# Patient Record
Sex: FEMALE | Race: WHITE | Employment: FULL TIME | URBAN - METROPOLITAN AREA
[De-identification: names, ages, dates, MRNs, and addresses within clinical notes are randomized per-mention and may not be internally consistent; named-entity substitution may affect disease eponyms.]

---

## 2019-04-01 ENCOUNTER — HOSPITAL ENCOUNTER (OUTPATIENT)
Dept: NUCLEAR MEDICINE | Age: 29
Discharge: HOME | End: 2019-04-01
Attending: INTERNAL MEDICINE
Payer: COMMERCIAL

## 2019-04-01 DIAGNOSIS — R11.10 VOMITING: ICD-10-CM

## 2019-04-01 DIAGNOSIS — R19.7 DIARRHEA: ICD-10-CM

## 2019-04-01 DIAGNOSIS — K31.84 GASTROPARESIS: ICD-10-CM

## 2019-04-01 PROCEDURE — 78264 GASTRIC EMPTYING IMG STUDY: CPT

## 2019-04-04 ENCOUNTER — OFFICE VISIT (OUTPATIENT)
Dept: URGENT CARE | Age: 29
End: 2019-04-04

## 2019-04-04 VITALS
HEART RATE: 85 BPM | DIASTOLIC BLOOD PRESSURE: 83 MMHG | SYSTOLIC BLOOD PRESSURE: 128 MMHG | WEIGHT: 158 LBS | BODY MASS INDEX: 24.8 KG/M2 | OXYGEN SATURATION: 99 % | HEIGHT: 67 IN | TEMPERATURE: 98.1 F | RESPIRATION RATE: 16 BRPM

## 2019-04-04 DIAGNOSIS — L08.9 LOCALIZED BACTERIAL INFECTION OF SKIN: Primary | ICD-10-CM

## 2019-04-04 DIAGNOSIS — B96.89 LOCALIZED BACTERIAL INFECTION OF SKIN: Primary | ICD-10-CM

## 2019-04-04 RX ORDER — MINERAL OIL
180 ENEMA (ML) RECTAL
COMMUNITY

## 2019-04-04 RX ORDER — CEPHALEXIN 500 MG/1
500 CAPSULE ORAL 2 TIMES DAILY
Qty: 20 CAP | Refills: 0 | Status: SHIPPED | OUTPATIENT
Start: 2019-04-04 | End: 2019-04-14

## 2019-04-04 RX ORDER — SERTRALINE HYDROCHLORIDE 100 MG/1
100 TABLET, FILM COATED ORAL DAILY
COMMUNITY

## 2019-04-04 NOTE — PATIENT INSTRUCTIONS
Should see your primary care doctor:  Given rosebush injury to consider sporotrichosis (fungus), they would need to check liver enzymes prior to initiating treatment. Follow up within next 2-4 days  New, worsening or changes, follow up immediatly       Scrapes (Abrasions): Care Instructions  Your Care Instructions  Scrapes (abrasions) are wounds where your skin has been rubbed or torn off. Most scrapes do not go deep into the skin, but some may remove several layers of skin. Scrapes usually don't bleed much, but they may ooze pinkish fluid. Scrapes on the head or face may appear worse than they are. They may bleed a lot because of the good blood supply to this area. Most scrapes heal well and may not need a bandage. They usually heal within 3 to 7 days. A large, deep scrape may take 1 to 2 weeks or longer to heal. A scab may form on some scrapes. Follow-up care is a key part of your treatment and safety. Be sure to make and go to all appointments, and call your doctor if you are having problems. It's also a good idea to know your test results and keep a list of the medicines you take. How can you care for yourself at home? · If your doctor told you how to care for your wound, follow your doctor's instructions. If you did not get instructions, follow this general advice:  ? Wash the scrape with clean water 2 times a day. Don't use hydrogen peroxide or alcohol, which can slow healing. ? You may cover the scrape with a thin layer of petroleum jelly, such as Vaseline, and a nonstick bandage. ? Apply more petroleum jelly and replace the bandage as needed. · Prop up the injured area on a pillow anytime you sit or lie down during the next 3 days. Try to keep it above the level of your heart. This will help reduce swelling. · Be safe with medicines. Take pain medicines exactly as directed. ? If the doctor gave you a prescription medicine for pain, take it as prescribed.   ? If you are not taking a prescription pain medicine, ask your doctor if you can take an over-the-counter medicine. When should you call for help? Call your doctor now or seek immediate medical care if:    · You have signs of infection, such as:  ? Increased pain, swelling, warmth, or redness around the scrape. ? Red streaks leading from the scrape. ? Pus draining from the scrape. ? A fever.     · The scrape starts to bleed, and blood soaks through the bandage. Oozing small amounts of blood is normal.    Watch closely for changes in your health, and be sure to contact your doctor if the scrape is not getting better each day. Where can you learn more? Go to http://bryson-dara.info/. Enter A374 in the search box to learn more about \"Scrapes (Abrasions): Care Instructions. \"  Current as of: September 23, 2018  Content Version: 11.9  © 2732-5476 [employer]. Care instructions adapted under license by [employer] (which disclaims liability or warranty for this information). If you have questions about a medical condition or this instruction, always ask your healthcare professional. [place] [address] any warranty or liability for your use of this information.

## 2019-04-04 NOTE — PROGRESS NOTES
Here for left index finger redness quarter sized on back of hand near knuckle  Onset 1 day after scrape by ajit  Denies fever, chills trouble moving, numbness or tingling  Up to date on tetanus  Has not tried meds  Denies diabetes or immune system compromise             History reviewed. No pertinent past medical history. History reviewed. No pertinent surgical history. History reviewed. No pertinent family history. Social History     Socioeconomic History    Marital status: UNKNOWN     Spouse name: Not on file    Number of children: Not on file    Years of education: Not on file    Highest education level: Not on file   Occupational History    Not on file   Social Needs    Financial resource strain: Not on file    Food insecurity:     Worry: Not on file     Inability: Not on file    Transportation needs:     Medical: Not on file     Non-medical: Not on file   Tobacco Use    Smoking status: Never Smoker    Smokeless tobacco: Never Used   Substance and Sexual Activity    Alcohol use: Not on file    Drug use: Not on file    Sexual activity: Not on file   Lifestyle    Physical activity:     Days per week: Not on file     Minutes per session: Not on file    Stress: Not on file   Relationships    Social connections:     Talks on phone: Not on file     Gets together: Not on file     Attends Tenriism service: Not on file     Active member of club or organization: Not on file     Attends meetings of clubs or organizations: Not on file     Relationship status: Not on file    Intimate partner violence:     Fear of current or ex partner: Not on file     Emotionally abused: Not on file     Physically abused: Not on file     Forced sexual activity: Not on file   Other Topics Concern    Not on file   Social History Narrative    Not on file                ALLERGIES: Patient has no known allergies. Review of Systems   Constitutional: Negative for chills and fever.    Neurological: Negative for dizziness. All other systems reviewed and are negative. Vitals:    04/04/19 0932   BP: 128/83   Pulse: 85   Resp: 16   Temp: 98.1 °F (36.7 °C)   SpO2: 99%   Weight: 158 lb (71.7 kg)   Height: 5' 7\" (1.702 m)       Physical Exam   Constitutional: She is oriented to person, place, and time. No distress. Well appearing    HENT:   Mouth/Throat: Oropharynx is clear and moist.   Eyes: Pupils are equal, round, and reactive to light. EOM are normal.   Neck: Neck supple. Cardiovascular: Normal rate, regular rhythm and normal heart sounds. Pulmonary/Chest: Effort normal and breath sounds normal.   Lymphadenopathy:     She has no cervical adenopathy. Neurological: She is alert and oriented to person, place, and time. Skin: She is not diaphoretic. Quarter sized area of erythema skin over left knuckle MCP . No joinline TTP. Full AROM without pain. 5/5 flexion and extension strength. Good cap refill. No streaking. No finger edema or wound edema. No foreign body identified. Psychiatric: She has a normal mood and affect. Her behavior is normal. Thought content normal.       MDM     Differential Diagnosis; Clinical Impression; Plan:       CLINICAL IMPRESSION:  (L08.9,  B96.89) Localized bacterial infection of skin  (primary encounter diagnosis)    Orders Placed This Encounter      cephALEXin (KEFLEX) 500 mg capsule          Sig: Take 1 Cap by mouth two (2) times a day for 10 days. Dispense:  20 Cap          Refill:  0      Plan:  DDX consider sporotrichosis given rosebush injury  Advised PCP eval in 2-4 days; should have liver enzymes checked by PCP prior to initiating treatment if not improving on cephalexin  Warm/hot compresses  Start cephalexin for possible bacterial etiology  Up to date on tetanus (patient promotes < 5 years ago)    We have reviewed concerning signs/symptoms, normal vs abnormal progression of medical condition and when to seek immediate medical attention.   Schedule with PCP or Urgent Care immediately for worsening or new symptoms. You should see your PCP for updates on your routine health maintenance.              Procedures

## 2020-09-10 ENCOUNTER — DOCUMENTATION ONLY (OUTPATIENT)
Dept: CARDIOLOGY CLINIC | Age: 30
End: 2020-09-10

## 2020-10-15 ENCOUNTER — CLINICAL SUPPORT (OUTPATIENT)
Dept: CARDIOLOGY CLINIC | Age: 30
End: 2020-10-15

## 2020-10-15 ENCOUNTER — OFFICE VISIT (OUTPATIENT)
Dept: CARDIOLOGY CLINIC | Age: 30
End: 2020-10-15
Payer: COMMERCIAL

## 2020-10-15 VITALS
SYSTOLIC BLOOD PRESSURE: 134 MMHG | HEIGHT: 67 IN | BODY MASS INDEX: 27.37 KG/M2 | DIASTOLIC BLOOD PRESSURE: 84 MMHG | RESPIRATION RATE: 18 BRPM | OXYGEN SATURATION: 98 % | HEART RATE: 84 BPM | WEIGHT: 174.4 LBS

## 2020-10-15 DIAGNOSIS — R00.2 PALPITATION: ICD-10-CM

## 2020-10-15 DIAGNOSIS — R07.9 CHEST PAIN, UNSPECIFIED TYPE: ICD-10-CM

## 2020-10-15 DIAGNOSIS — R00.2 PALPITATIONS: Primary | ICD-10-CM

## 2020-10-15 DIAGNOSIS — R07.9 CHEST PAIN, UNSPECIFIED TYPE: Primary | ICD-10-CM

## 2020-10-15 PROCEDURE — 99204 OFFICE O/P NEW MOD 45 MIN: CPT | Performed by: INTERNAL MEDICINE

## 2020-10-15 PROCEDURE — 93000 ELECTROCARDIOGRAM COMPLETE: CPT | Performed by: INTERNAL MEDICINE

## 2020-10-15 RX ORDER — GLUCOSAMINE SULFATE 1500 MG
POWDER IN PACKET (EA) ORAL DAILY
COMMUNITY

## 2020-10-15 RX ORDER — SUMATRIPTAN 50 MG/1
50 TABLET, FILM COATED ORAL
COMMUNITY

## 2020-10-15 NOTE — PROGRESS NOTES
1. Have you been to the ER, urgent care clinic since your last visit? Hospitalized since your last visit? No    2. Have you seen or consulted any other health care providers outside of the [address] [place] [address] since your last visit? Include any pap smears or colon screening. No    Chief Complaint   Patient presents with    Chest Pain     NP, ref by Mary Gee NP.  C/O palps, CP lying down.

## 2020-10-15 NOTE — PROGRESS NOTES
Patient received a 2 week event monitor. Instructions given verbally as well as an instruction sheet. Pt verbalized understanding.     [facility] Event Monitoring

## 2020-10-15 NOTE — PROGRESS NOTES
[address]  [phone]     Subjective:      Lena Burrows is a 26 y.o. female with pmhx IBS and migraineis here to establish care. Referred by PCP for chest pressure and palpitation x 2-3 mos. If she sleeps on her side, the cp is relieved. Family hx early CAD (grandfather MI age 36)    She usually jogs 2-3 times a week, walks on opposite days. No exertional symptoms. Denies caffeine / stimulant intake    The patient denies shortness of breath, orthopnea, PND, LE edema, syncope, or presyncope. There are no active problems to display for this patient. Cortney Bonilla NP  History reviewed. No pertinent past medical history. History reviewed. No pertinent surgical history.   No Known Allergies   Family History   Problem Relation Age of Onset    Coronary Artery Disease Maternal Grandfather       Social History     Socioeconomic History    Marital status: UNKNOWN     Spouse name: Not on file    Number of children: Not on file    Years of education: Not on file    Highest education level: Not on file   Occupational History    Not on file   Social Needs    Financial resource strain: Not on file    Food insecurity     Worry: Not on file     Inability: Not on file    Transportation needs     Medical: Not on file     Non-medical: Not on file   Tobacco Use    Smoking status: Never Smoker    Smokeless tobacco: Never Used   Substance and Sexual Activity    Alcohol use: Yes     Frequency: Monthly or less     Drinks per session: 1 or 2     Binge frequency: Never     Comment: rare    Drug use: Never    Sexual activity: Yes     Partners: Male     Birth control/protection: Pill   Lifestyle    Physical activity     Days per week: Not on file     Minutes per session: Not on file    Stress: Not on file   Relationships    Social connections     Talks on phone: Not on file     Gets together: Not on file     Attends Oriental orthodox service: Not on file     Active member of club or organization: Not on file     Attends meetings of clubs or organizations: Not on file     Relationship status: Not on file    Intimate partner violence     Fear of current or ex partner: Not on file     Emotionally abused: Not on file     Physically abused: Not on file     Forced sexual activity: Not on file   Other Topics Concern    Not on file   Social History Narrative    Not on file      Current Outpatient Medications   Medication Sig    SUMAtriptan (IMITREX) 50 mg tablet Take 50 mg by mouth once as needed for Migraine.  cholecalciferol (Vitamin D3) 25 mcg (1,000 unit) cap Take  by mouth daily.  fexofenadine (ALLEGRA) 180 mg tablet Take 180 mg by mouth daily as needed.  sertraline (ZOLOFT) 100 mg tablet Take 100 mg by mouth daily.  L-norgest/e.estradiol-e.estrad (SEASONIQUE PO) Take  by mouth. No current facility-administered medications for this visit. Review of Symptoms:  11 systems reviewed, negative other than as stated in the HPI    Physical ExamPhysical Exam:    Vitals:    10/15/20 1435 10/15/20 1449 10/15/20 1504   BP: (!) 154/94 (!) 148/92 134/84   Pulse: 84     Resp: 18     SpO2: 98%     Weight: 174 lb 6.4 oz (79.1 kg)     Height: 5' 7\" (1.702 m)       Body mass index is 27.31 kg/m². General PE  Gen:  NAD  Mental Status - Alert. General Appearance - Not in acute distress. HEENT:  PERRL, no carotid bruits or JVD  Chest and Lung Exam   Inspection: Accessory muscles - No use of accessory muscles in breathing. Auscultation:   Breath sounds: - Normal.   Cardiovascular   Inspection: Jugular vein - Bilateral - Inspection Normal.   Palpation/Percussion:   Apical Impulse: - Normal.   Auscultation: Rhythm - Regular. Heart Sounds - S1 WNL and S2 WNL. No S3 or S4. Murmurs & Other Heart Sounds: Auscultation of the heart reveals - No Murmurs.    Peripheral Vascular   Upper Extremity: Inspection - Bilateral - No Cyanotic nailbeds or Digital clubbing. Lower Extremity:   Palpation: Edema - Bilateral - No edema. Abdomen:   Soft, non-tender, bowel sounds are active. Neuro: A&O times 3, CN and motor grossly WNL    Labs:   No results found for: CHOL, CHOLX, CHLST, CHOLV, 187729, HDL, HDLP, LDL, LDLC, DLDLP, TGLX, TRIGL, TRIGP, CHHD, CHHDX  No results found for: CPK, CPKX, CPX  No results found for: NA, K, CL, CO2, AGAP, GLU, BUN, CREA, BUCR, GFRAA, GFRNA, CA, TBIL, TBILI, AP, TP, ALB, GLOB, AGRAT, ALT    EKG:  SR     Assessment:     Assessment:      1. Chest pain, unspecified type    2. Palpitation        Orders Placed This Encounter    LOOP MONITOR, Clinic Performed     Standing Status:   Future     Standing Expiration Date:   4/15/2021     Order Specific Question:   Reason for Exam:     Answer:   intermittent palpitation    AMB POC EKG ROUTINE W/ 12 LEADS, INTER & REP     Order Specific Question:   Reason for Exam:     Answer:   routine    SUMAtriptan (IMITREX) 50 mg tablet     Sig: Take 50 mg by mouth once as needed for Migraine.  cholecalciferol (Vitamin D3) 25 mcg (1,000 unit) cap     Sig: Take  by mouth daily. Plan:     Atypical cp  Obtain echo and stress ekg    Palpitation, intermittent  TSH/Hgb/Lytes stable 9/2020  Obtain 2 week event    9/2020     Continue current care and f/u when testing complete    Jenni Ring MD       Patient seen and examined by me with nurse practitioner. I personally performed all components of the history, physical, and medical decision making and agree with the assessment and plan as noted.     Jenni Ring MD

## 2020-11-04 ENCOUNTER — TELEPHONE (OUTPATIENT)
Dept: CARDIOLOGY CLINIC | Age: 30
End: 2020-11-04

## 2020-11-04 NOTE — TELEPHONE ENCOUNTER
----- Message from Truong Goodson MD sent at 11/2/2020  1:45 PM EST -----  Inform her monitor is k      Called pt but voice mail box is full. Will try later. Returned call,verified pt with two pt identifiers, advised pt her monitor was normal, okay. Pt verbalized understanding.

## 2020-11-10 ENCOUNTER — OFFICE VISIT (OUTPATIENT)
Dept: URGENT CARE | Age: 30
End: 2020-11-10
Payer: COMMERCIAL

## 2020-11-10 VITALS — HEART RATE: 80 BPM | OXYGEN SATURATION: 96 % | RESPIRATION RATE: 17 BRPM | TEMPERATURE: 99.6 F

## 2020-11-10 DIAGNOSIS — Z20.822 ENCOUNTER FOR LABORATORY TESTING FOR COVID-19 VIRUS: Primary | ICD-10-CM

## 2020-11-10 PROCEDURE — 99211 OFF/OP EST MAY X REQ PHY/QHP: CPT | Performed by: FAMILY MEDICINE

## 2020-11-12 LAB — SARS-COV-2, NAA: NOT DETECTED

## 2020-11-13 ENCOUNTER — ANCILLARY PROCEDURE (OUTPATIENT)
Dept: CARDIOLOGY CLINIC | Age: 30
End: 2020-11-13
Payer: COMMERCIAL

## 2020-11-13 VITALS
DIASTOLIC BLOOD PRESSURE: 84 MMHG | SYSTOLIC BLOOD PRESSURE: 134 MMHG | WEIGHT: 174 LBS | BODY MASS INDEX: 27.31 KG/M2 | HEIGHT: 67 IN

## 2020-11-13 PROCEDURE — 93306 TTE W/DOPPLER COMPLETE: CPT | Performed by: INTERNAL MEDICINE

## 2020-11-16 LAB
ECHO AO ROOT DIAM: 2.67 CM
ECHO AV PEAK GRADIENT: 3.84 MMHG
ECHO AV PEAK VELOCITY: 97.96 CM/S
ECHO LA VOL 2C: 23.27 ML (ref 22–52)
ECHO LA VOL 4C: 25.24 ML (ref 22–52)
ECHO LA VOLUME INDEX A2C: 12.21 ML/M2 (ref 16–28)
ECHO LA VOLUME INDEX A4C: 13.24 ML/M2 (ref 16–28)
ECHO LV INTERNAL DIMENSION DIASTOLIC: 4.86 CM (ref 3.9–5.3)
ECHO LV INTERNAL DIMENSION SYSTOLIC: 2.84 CM
ECHO LV ISOVOLUMETRIC RELAXATION TIME (IVRT): 55.45 MS
ECHO LV IVSD: 0.86 CM (ref 0.6–0.9)
ECHO LV MASS 2D: 147.6 G (ref 67–162)
ECHO LV MASS INDEX 2D: 77.4 G/M2 (ref 43–95)
ECHO LV POSTERIOR WALL DIASTOLIC: 0.91 CM (ref 0.6–0.9)
ECHO LVOT PEAK GRADIENT: 3.05 MMHG
ECHO LVOT PEAK VELOCITY: 87.26 CM/S
ECHO MV "A" WAVE DURATION: 136.78 MS
ECHO MV A VELOCITY: 54.03 CENTIMETER/SECOND
ECHO MV E DECELERATION TIME (DT): 158.91 MS
ECHO MV E VELOCITY: 68.58 CENTIMETER/SECOND

## 2020-11-20 ENCOUNTER — TELEPHONE (OUTPATIENT)
Dept: CARDIOLOGY CLINIC | Age: 30
End: 2020-11-20

## 2020-11-20 ENCOUNTER — ANCILLARY PROCEDURE (OUTPATIENT)
Dept: CARDIOLOGY CLINIC | Age: 30
End: 2020-11-20
Payer: COMMERCIAL

## 2020-11-20 VITALS
WEIGHT: 174 LBS | SYSTOLIC BLOOD PRESSURE: 150 MMHG | HEIGHT: 67 IN | BODY MASS INDEX: 27.31 KG/M2 | DIASTOLIC BLOOD PRESSURE: 84 MMHG

## 2020-11-20 LAB
STRESS ANGINA INDEX: 0
STRESS BASELINE DIAS BP: 84 MMHG
STRESS BASELINE HR: 95 BPM
STRESS BASELINE SYS BP: 150 MMHG
STRESS ESTIMATED WORKLOAD: 10.1 METS
STRESS EXERCISE DUR MIN: NORMAL MIN:SEC
STRESS O2 SAT PEAK: 97 %
STRESS O2 SAT REST: 99 %
STRESS PEAK DIAS BP: 80 MMHG
STRESS PEAK SYS BP: 190 MMHG
STRESS PERCENT HR ACHIEVED: 99 %
STRESS POST PEAK HR: 187 BPM
STRESS RATE PRESSURE PRODUCT: NORMAL BPM*MMHG
STRESS SR DUKE TREADMILL SCORE: 8
STRESS ST DEPRESSION: 0 MM
STRESS ST ELEVATION: 0 MM
STRESS STAGE 1 BP: NORMAL MMHG
STRESS STAGE 1 DURATION: 3 MIN:SEC
STRESS STAGE 1 HR: 130 BPM
STRESS STAGE 2 BP: NORMAL MMHG
STRESS STAGE 2 DURATION: 3 MIN:SEC
STRESS STAGE 2 HR: 162 BPM
STRESS STAGE 3 BP: NORMAL MMHG
STRESS STAGE 3 DURATION: 2 MIN:SEC
STRESS STAGE 3 HR: 187 BPM
STRESS STAGE RECOVERY 1 BP: NORMAL MMHG
STRESS STAGE RECOVERY 1 DURATION: 6 MIN:SEC
STRESS STAGE RECOVERY 1 HR: 112 BPM
STRESS TARGET HR: 188 BPM

## 2020-11-20 PROCEDURE — 93015 CV STRESS TEST SUPVJ I&R: CPT | Performed by: INTERNAL MEDICINE

## 2020-11-23 ENCOUNTER — TELEPHONE (OUTPATIENT)
Dept: CARDIOLOGY CLINIC | Age: 30
End: 2020-11-23

## 2020-11-23 NOTE — TELEPHONE ENCOUNTER
----- Message from Isabel Avila MD sent at 11/23/2020  8:40 AM EST -----  Inform her stress test is k. Called pt,verified pt with two pt identifiers, advised pt her stress test is normal. Pt verbalized understanding and had no further questions.

## 2020-11-30 ENCOUNTER — OFFICE VISIT (OUTPATIENT)
Dept: CARDIOLOGY CLINIC | Age: 30
End: 2020-11-30
Payer: COMMERCIAL

## 2020-11-30 VITALS
SYSTOLIC BLOOD PRESSURE: 122 MMHG | DIASTOLIC BLOOD PRESSURE: 80 MMHG | RESPIRATION RATE: 18 BRPM | HEIGHT: 67 IN | OXYGEN SATURATION: 97 % | WEIGHT: 179 LBS | HEART RATE: 84 BPM | BODY MASS INDEX: 28.09 KG/M2

## 2020-11-30 DIAGNOSIS — R00.2 PALPITATIONS: ICD-10-CM

## 2020-11-30 DIAGNOSIS — R07.9 CHEST PAIN, UNSPECIFIED TYPE: Primary | ICD-10-CM

## 2020-11-30 PROCEDURE — 99213 OFFICE O/P EST LOW 20 MIN: CPT | Performed by: INTERNAL MEDICINE

## 2020-11-30 NOTE — PROGRESS NOTES
[address], [place], [address] [facility]  [phone]     Subjective:      Flavia Batres is a 26 y.o. female is here to discuss test result. Essentially negative cardiac work up. Still endorses occasional chest discomfort when laying supine. No sob. Palpitation is better, HR in the 90s    The patient denies sob, orthopnea, PND, LE edema, palpitations, syncope, or presyncope. There are no active problems to display for this patient. Shantel Villa NP  History reviewed. No pertinent past medical history. History reviewed. No pertinent surgical history.   No Known Allergies   Family History   Problem Relation Age of Onset    Coronary Artery Disease Maternal Grandfather       Social History     Socioeconomic History    Marital status: UNKNOWN     Spouse name: Not on file    Number of children: Not on file    Years of education: Not on file    Highest education level: Not on file   Occupational History    Not on file   Social Needs    Financial resource strain: Not on file    Food insecurity     Worry: Not on file     Inability: Not on file    Transportation needs     Medical: Not on file     Non-medical: Not on file   Tobacco Use    Smoking status: Never Smoker    Smokeless tobacco: Never Used   Substance and Sexual Activity    Alcohol use: Yes     Frequency: Monthly or less     Drinks per session: 1 or 2     Binge frequency: Never     Comment: rare    Drug use: Never    Sexual activity: Yes     Partners: Male     Birth control/protection: Pill   Lifestyle    Physical activity     Days per week: Not on file     Minutes per session: Not on file    Stress: Not on file   Relationships    Social connections     Talks on phone: Not on file     Gets together: Not on file     Attends Confucianist service: Not on file     Active member of club or organization: Not on file     Attends meetings of clubs or organizations: Not on file     Relationship status: Not on file    Intimate partner violence     Fear of current or ex partner: Not on file     Emotionally abused: Not on file     Physically abused: Not on file     Forced sexual activity: Not on file   Other Topics Concern    Not on file   Social History Narrative    Not on file      Current Outpatient Medications   Medication Sig    SUMAtriptan (IMITREX) 50 mg tablet Take 50 mg by mouth once as needed for Migraine.  cholecalciferol (Vitamin D3) 25 mcg (1,000 unit) cap Take  by mouth daily.  fexofenadine (ALLEGRA) 180 mg tablet Take 180 mg by mouth daily as needed.  sertraline (ZOLOFT) 100 mg tablet Take 100 mg by mouth daily.  L-norgest/e.estradiol-e.estrad (SEASONIQUE PO) Take  by mouth. No current facility-administered medications for this visit. Review of Symptoms:  11 systems reviewed, negative other than as stated in the HPI    Physical ExamPhysical Exam:    Vitals:    11/30/20 1015 11/30/20 1017   BP: 118/80 122/80   Pulse: 84    Resp: 18    SpO2: 97%    Weight: 179 lb (81.2 kg)    Height: 5' 7\" (1.702 m)      Body mass index is 28.04 kg/m². General PE  Gen:  NAD  Mental Status - Alert. General Appearance - Not in acute distress. HEENT:  PERRL, no carotid bruits or JVD  Chest and Lung Exam   Inspection: Accessory muscles - No use of accessory muscles in breathing. Auscultation:   Breath sounds: - Normal.   Cardiovascular   Inspection: Jugular vein - Bilateral - Inspection Normal.   Palpation/Percussion:   Apical Impulse: - Normal.   Auscultation: Rhythm - Regular. Heart Sounds - S1 WNL and S2 WNL. No S3 or S4. Murmurs & Other Heart Sounds: Auscultation of the heart reveals - No Murmurs. Peripheral Vascular   Upper Extremity: Inspection - Bilateral - No Cyanotic nailbeds or Digital clubbing. Lower Extremity:   Palpation: Edema - Bilateral - No edema. Abdomen:   Soft, non-tender, bowel sounds are active.   Neuro: A&O times 3, CN and motor grossly WNL    Labs:   No results found for: CHOL, CHOLX, CHLST, CHOLV, 350081, HDL, HDLP, LDL, LDLC, DLDLP, TGLX, TRIGL, TRIGP, CHHD, CHHDX  No results found for: CPK, CPKX, CPX  No results found for: NA, K, CL, CO2, AGAP, GLU, BUN, CREA, BUCR, GFRAA, GFRNA, CA, TBIL, TBILI, AP, TP, ALB, GLOB, AGRAT, ALT    EKG:       Assessment:     Assessment:      1. Chest pain, unspecified type    2. Palpitations        No orders of the defined types were placed in this encounter. Plan:     Atypical cp: Normal stress test and echo 11/2020  She follows with Dr Colleen David for her GI, she will contact his office     Palpitation, intermittent  TSH/Hgb/Lytes stable 9/2020  2 week event 10/2020: No arrhythmia      9/2020     Continue current care and f/u with us LUIS Diaz NP       Patient seen and examined by me with nurse practitioner. I personally performed all components of the history, physical, and medical decision making and agree with the assessment and plan as noted.     Jenni Ring MD

## 2020-11-30 NOTE — PROGRESS NOTES
Chief Complaint   Patient presents with    Results     Here to discuss results of echo, stress test and loop monitor -    Chest Pain     chest pressure when going to sleep at night     Dizziness     when exercising      1. Have you been to the ER, urgent care clinic since your last visit? Hospitalized since your last visit? No     2. Have you seen or consulted any other health care providers outside of the [address] [place] [address] since your last visit? Include any pap smears or colon screening.   No

## 2023-05-12 RX ORDER — SERTRALINE HYDROCHLORIDE 100 MG/1
100 TABLET, FILM COATED ORAL DAILY
COMMUNITY

## 2023-05-12 RX ORDER — FEXOFENADINE HCL 180 MG/1
TABLET ORAL DAILY PRN
COMMUNITY

## 2023-05-12 RX ORDER — SUMATRIPTAN 50 MG/1
50 TABLET, FILM COATED ORAL
COMMUNITY

## 2024-03-18 ENCOUNTER — HOSPITAL ENCOUNTER (EMERGENCY)
Facility: HOSPITAL | Age: 34
Discharge: HOME | End: 2024-03-18
Attending: STUDENT IN AN ORGANIZED HEALTH CARE EDUCATION/TRAINING PROGRAM
Payer: COMMERCIAL

## 2024-03-18 VITALS
HEIGHT: 67 IN | HEART RATE: 76 BPM | DIASTOLIC BLOOD PRESSURE: 77 MMHG | WEIGHT: 185 LBS | SYSTOLIC BLOOD PRESSURE: 136 MMHG | OXYGEN SATURATION: 99 % | TEMPERATURE: 99.2 F | BODY MASS INDEX: 29.03 KG/M2 | RESPIRATION RATE: 18 BRPM

## 2024-03-18 DIAGNOSIS — R55 SYNCOPE AND COLLAPSE: ICD-10-CM

## 2024-03-18 DIAGNOSIS — R10.30 LOWER ABDOMINAL PAIN: Primary | ICD-10-CM

## 2024-03-18 LAB
ALBUMIN SERPL-MCNC: 3.5 G/DL (ref 3.5–5)
ALBUMIN/GLOB SERPL: 1 (ref 1.1–2.2)
ALP SERPL-CCNC: 98 U/L (ref 45–117)
ALT SERPL-CCNC: 18 U/L (ref 12–78)
ANION GAP SERPL CALC-SCNC: 9 MMOL/L (ref 5–15)
APPEARANCE UR: ABNORMAL
AST SERPL-CCNC: 13 U/L (ref 15–37)
BACTERIA URNS QL MICRO: ABNORMAL /HPF
BASOPHILS # BLD: 0 K/UL (ref 0–0.1)
BASOPHILS NFR BLD: 0 % (ref 0–1)
BILIRUB SERPL-MCNC: 0.3 MG/DL (ref 0.2–1)
BILIRUB UR QL: NEGATIVE
BUN SERPL-MCNC: 13 MG/DL (ref 6–20)
BUN/CREAT SERPL: 19 (ref 12–20)
CALCIUM SERPL-MCNC: 8.5 MG/DL (ref 8.5–10.1)
CHLORIDE SERPL-SCNC: 101 MMOL/L (ref 97–108)
CO2 SERPL-SCNC: 25 MMOL/L (ref 21–32)
COLOR UR: ABNORMAL
CREAT SERPL-MCNC: 0.69 MG/DL (ref 0.55–1.02)
DIFFERENTIAL METHOD BLD: ABNORMAL
EOSINOPHIL # BLD: 0 K/UL (ref 0–0.4)
EOSINOPHIL NFR BLD: 0 % (ref 0–7)
EPITH CASTS URNS QL MICRO: ABNORMAL /LPF
ERYTHROCYTE [DISTWIDTH] IN BLOOD BY AUTOMATED COUNT: 14.5 % (ref 11.5–14.5)
GLOBULIN SER CALC-MCNC: 3.5 G/DL (ref 2–4)
GLUCOSE SERPL-MCNC: 111 MG/DL (ref 65–100)
GLUCOSE UR STRIP.AUTO-MCNC: NEGATIVE MG/DL
HCG UR QL: NEGATIVE
HCT VFR BLD AUTO: 38.4 % (ref 35–47)
HGB BLD-MCNC: 13.3 G/DL (ref 11.5–16)
HGB UR QL STRIP: ABNORMAL
IMM GRANULOCYTES # BLD AUTO: 0 K/UL (ref 0–0.04)
IMM GRANULOCYTES NFR BLD AUTO: 0 % (ref 0–0.5)
KETONES UR QL STRIP.AUTO: NEGATIVE MG/DL
LEUKOCYTE ESTERASE UR QL STRIP.AUTO: ABNORMAL
LYMPHOCYTES # BLD: 2.2 K/UL (ref 0.8–3.5)
LYMPHOCYTES NFR BLD: 18 % (ref 12–49)
MCH RBC QN AUTO: 29.7 PG (ref 26–34)
MCHC RBC AUTO-ENTMCNC: 34.6 G/DL (ref 30–36.5)
MCV RBC AUTO: 85.7 FL (ref 80–99)
MONOCYTES # BLD: 0.8 K/UL (ref 0–1)
MONOCYTES NFR BLD: 6 % (ref 5–13)
NEUTS SEG # BLD: 8.9 K/UL (ref 1.8–8)
NEUTS SEG NFR BLD: 76 % (ref 32–75)
NITRITE UR QL STRIP.AUTO: NEGATIVE
NRBC # BLD: 0 K/UL (ref 0–0.01)
NRBC BLD-RTO: 0 PER 100 WBC
PH UR STRIP: 7 (ref 5–8)
PLATELET # BLD AUTO: 274 K/UL (ref 150–400)
PMV BLD AUTO: 9.6 FL (ref 8.9–12.9)
POTASSIUM SERPL-SCNC: 3.2 MMOL/L (ref 3.5–5.1)
PROT SERPL-MCNC: 7 G/DL (ref 6.4–8.2)
PROT UR STRIP-MCNC: NEGATIVE MG/DL
RBC # BLD AUTO: 4.48 M/UL (ref 3.8–5.2)
RBC #/AREA URNS HPF: ABNORMAL /HPF (ref 0–5)
SODIUM SERPL-SCNC: 135 MMOL/L (ref 136–145)
SP GR UR REFRACTOMETRY: 1.02 (ref 1–1.03)
TROPONIN I SERPL HS-MCNC: <4 NG/L (ref 0–51)
UROBILINOGEN UR QL STRIP.AUTO: 0.2 EU/DL (ref 0.2–1)
WBC # BLD AUTO: 12 K/UL (ref 3.6–11)
WBC URNS QL MICRO: ABNORMAL /HPF (ref 0–4)

## 2024-03-18 PROCEDURE — 6360000002 HC RX W HCPCS: Performed by: STUDENT IN AN ORGANIZED HEALTH CARE EDUCATION/TRAINING PROGRAM

## 2024-03-18 PROCEDURE — 80053 COMPREHEN METABOLIC PANEL: CPT

## 2024-03-18 PROCEDURE — 93005 ELECTROCARDIOGRAM TRACING: CPT | Performed by: STUDENT IN AN ORGANIZED HEALTH CARE EDUCATION/TRAINING PROGRAM

## 2024-03-18 PROCEDURE — 81025 URINE PREGNANCY TEST: CPT

## 2024-03-18 PROCEDURE — 2580000003 HC RX 258: Performed by: STUDENT IN AN ORGANIZED HEALTH CARE EDUCATION/TRAINING PROGRAM

## 2024-03-18 PROCEDURE — 36415 COLL VENOUS BLD VENIPUNCTURE: CPT

## 2024-03-18 PROCEDURE — 81001 URINALYSIS AUTO W/SCOPE: CPT

## 2024-03-18 PROCEDURE — 85025 COMPLETE CBC W/AUTO DIFF WBC: CPT

## 2024-03-18 PROCEDURE — 84484 ASSAY OF TROPONIN QUANT: CPT

## 2024-03-18 PROCEDURE — 96374 THER/PROPH/DIAG INJ IV PUSH: CPT

## 2024-03-18 PROCEDURE — 99284 EMERGENCY DEPT VISIT MOD MDM: CPT

## 2024-03-18 RX ORDER — 0.9 % SODIUM CHLORIDE 0.9 %
1000 INTRAVENOUS SOLUTION INTRAVENOUS ONCE
Status: COMPLETED | OUTPATIENT
Start: 2024-03-18 | End: 2024-03-18

## 2024-03-18 RX ORDER — POLYETHYLENE GLYCOL 3350 17 G/17G
17 POWDER, FOR SOLUTION ORAL DAILY PRN
Qty: 510 G | Refills: 5 | Status: SHIPPED | OUTPATIENT
Start: 2024-03-18 | End: 2024-04-17

## 2024-03-18 RX ORDER — ONDANSETRON 2 MG/ML
4 INJECTION INTRAMUSCULAR; INTRAVENOUS
Status: COMPLETED | OUTPATIENT
Start: 2024-03-18 | End: 2024-03-18

## 2024-03-18 RX ADMIN — ONDANSETRON 4 MG: 2 INJECTION INTRAMUSCULAR; INTRAVENOUS at 21:01

## 2024-03-18 RX ADMIN — SODIUM CHLORIDE 1000 ML: 9 INJECTION, SOLUTION INTRAVENOUS at 20:46

## 2024-03-18 ASSESSMENT — PAIN - FUNCTIONAL ASSESSMENT
PAIN_FUNCTIONAL_ASSESSMENT: NONE - DENIES PAIN
PAIN_FUNCTIONAL_ASSESSMENT: NONE - DENIES PAIN

## 2024-03-18 NOTE — ED TRIAGE NOTES
Pt presents via EMS with c/o sudden onset of abd pain.  Problem with having aBM and had an unwitnessed syncopal episode.  Pt A&Ox4 upon EMS arrival and ambulatory on scene.  Unknown pregnancy.

## 2024-03-19 NOTE — DISCHARGE INSTRUCTIONS
Your testing in the emergency department is reassuring.  I do not see evidence of a urinary tract infection, low blood counts, cardiac event or other medical emergency.  He may have passed out due to pain versus constipation versus physical straining.  Take the prescribed medication MiraLAX as needed for constipation, not necessarily every day.  Return to the emergency department for any additional episodes, any changing or worsening symptoms or other concerns.  Follow-up with your primary doctor for further evaluation as soon as possible.

## 2024-03-19 NOTE — ED PROVIDER NOTES
[facility] EMERGENCY DEPT  EMERGENCY DEPARTMENT ENCOUNTER      Pt Name: Ana Cristina Galicia  MRN: [id number]  Birthdate 1990  Date of evaluation: 3/18/2024  Provider: Delfino Castillo MD    CHIEF COMPLAINT       Chief Complaint   Patient presents with    Abdominal Pain       HISTORY OF PRESENT ILLNESS    HPI    33-year-old female presenting for syncope.  Patient states she was at a movie theater, began to have lower abdominal cramping pain, went to the bathroom, with straining to have a bowel movement, became lightheaded, dizzy with perioral tingling and then lowered herself to the ground.  She did not actually lose consciousness, but was unable to stand up on her own.    After she was helped into the ambulance, she states symptoms have started to improve.  She currently denies any lightheadedness, dizziness, chest pain, shortness of breath.  Abdominal pain is mostly resolved.    Patient states he has a long history of similar episodes, where she will pass out when she is in pain.  She has chronic constipation and has had passed out during painful bowel movements multiple times before.  She also history of endometriosis and has passed out due to cramping pain before.  She has been followed by GI before for gastroparesis.  Also states she has chronic constipation, takes Metamucil for this, and per the past has had Dulcolax but this often causes diarrhea.  Nursing notes reviewed.    REVIEW OF SYSTEMS     Review of Systems  Unless otherwise stated, a complete review of systems was asked of the patient. Pertinent positives are noted in the HPI section.    PAST MEDICAL HISTORY   History reviewed. No pertinent past medical history.    SURGICAL HISTORY     History reviewed. No pertinent surgical history.    CURRENT MEDICATIONS       Discharge Medication List as of 3/18/2024  9:55 PM        CONTINUE these medications which have NOT CHANGED    Details   vitamin D 25 MCG (1000 UT) CAPS Take by mouth dailyHistorical Med

## 2024-03-20 LAB
EKG ATRIAL RATE: 69 BPM
EKG DIAGNOSIS: NORMAL
EKG P AXIS: 44 DEGREES
EKG P-R INTERVAL: 176 MS
EKG Q-T INTERVAL: 402 MS
EKG QRS DURATION: 86 MS
EKG QTC CALCULATION (BAZETT): 430 MS
EKG R AXIS: 63 DEGREES
EKG T AXIS: 43 DEGREES
EKG VENTRICULAR RATE: 69 BPM

## 2024-03-20 PROCEDURE — 93010 ELECTROCARDIOGRAM REPORT: CPT | Performed by: SPECIALIST

## 2024-04-24 LAB
C. TRACHOMATIS, EXTERNAL RESULT: NEGATIVE
N. GONORRHOEAE, EXTERNAL RESULT: NEGATIVE

## 2024-04-26 LAB
ABO, EXTERNAL RESULT: NORMAL
HEP B, EXTERNAL RESULT: NEGATIVE
HEPATITIS C ANTIBODY, EXTERNAL RESULT: NONREACTIVE
HIV, EXTERNAL RESULT: NONREACTIVE
RUBELLA TITER, EXTERNAL RESULT: NORMAL
T. PALLIDUM (SYPHILIS) ANTIBODY, EXTERNAL RESULT: NONREACTIVE

## 2024-05-22 ENCOUNTER — TELEPHONE (OUTPATIENT)
Age: 34
End: 2024-05-22

## 2024-05-22 NOTE — TELEPHONE ENCOUNTER
patient called to accept the appt on 07/10 referel request.spoke to patient and gave guest policy.

## 2024-06-07 RX ORDER — ASPIRIN 81 MG/1
81 TABLET ORAL DAILY
COMMUNITY

## 2024-06-07 RX ORDER — CYANOCOBALAMIN 1000 UG/ML
1000 INJECTION, SOLUTION INTRAMUSCULAR; SUBCUTANEOUS ONCE
COMMUNITY

## 2024-06-10 ENCOUNTER — ROUTINE PRENATAL (OUTPATIENT)
Age: 34
End: 2024-06-10
Payer: COMMERCIAL

## 2024-06-10 ENCOUNTER — TELEPHONE (OUTPATIENT)
Age: 34
End: 2024-06-10

## 2024-06-10 VITALS
DIASTOLIC BLOOD PRESSURE: 80 MMHG | BODY MASS INDEX: 28.98 KG/M2 | HEIGHT: 67 IN | OXYGEN SATURATION: 98 % | HEART RATE: 92 BPM | SYSTOLIC BLOOD PRESSURE: 115 MMHG | RESPIRATION RATE: 16 BRPM

## 2024-06-10 DIAGNOSIS — O36.5920 SGA (SMALL FOR GESTATIONAL AGE), FETAL, AFFECTING CARE OF MOTHER, ANTEPARTUM, SECOND TRIMESTER, NOT APPLICABLE OR UNSPECIFIED FETUS: ICD-10-CM

## 2024-06-10 DIAGNOSIS — O24.419 GESTATIONAL DIABETES MELLITUS (GDM), ANTEPARTUM, GESTATIONAL DIABETES METHOD OF CONTROL UNSPECIFIED: Primary | ICD-10-CM

## 2024-06-10 DIAGNOSIS — Z3A.16 16 WEEKS GESTATION OF PREGNANCY: ICD-10-CM

## 2024-06-10 DIAGNOSIS — O09.512 AMA (ADVANCED MATERNAL AGE) PRIMIGRAVIDA 35+, SECOND TRIMESTER: ICD-10-CM

## 2024-06-10 DIAGNOSIS — Z36.89 SCREENING, ANTENATAL, FOR FETAL ANATOMIC SURVEY: ICD-10-CM

## 2024-06-10 PROCEDURE — 76805 OB US >/= 14 WKS SNGL FETUS: CPT | Performed by: OBSTETRICS & GYNECOLOGY

## 2024-06-10 PROCEDURE — 99203 OFFICE O/P NEW LOW 30 MIN: CPT | Performed by: OBSTETRICS & GYNECOLOGY

## 2024-06-10 RX ORDER — INSULIN HUMAN 100 [IU]/ML
8 INJECTION, SUSPENSION SUBCUTANEOUS NIGHTLY
Qty: 1 ADJUSTABLE DOSE PRE-FILLED PEN SYRINGE | Refills: 0 | Status: SHIPPED | OUTPATIENT
Start: 2024-06-10 | End: 2024-07-10

## 2024-06-10 NOTE — PROGRESS NOTES
Please see my progress or consultation note in Viewpoint filed under the Media and/or Imaging Tab in Epic.   We will call in the insulin prescription. We will also bring patient back to the office next week to meet with our NP. Ms. Sue Randolph will take care of the above. Thank you.

## 2024-06-10 NOTE — TELEPHONE ENCOUNTER
Patient called back because she had a missed call from us. Sandrine had called her to offer the genetic counseling services. The patient has declined to make an appt for genetic counseling at this time. I advised patient to call us should she change her mind or have questions.

## 2024-06-10 NOTE — PROGRESS NOTES
Room:   I have reviewed all needed documentation in preparation for visit. Verified patient by name and date of birth  Ana Cristina Galicia is a 33 y.o. female New Patient and Pregnancy Ultrasound  .  Vitals:    06/10/24 0855   BP: 115/80   Pulse: 92   Resp: 16   SpO2: 98%   ;  Patient states that she was extremely dizzy last night and most of the day yesterday. She describes it as \"being drunk feeling.\" Patient states that she feels that way often in the past week or two. Patient states that it's happening every other day and sometimes it lasts a few hours and sometimes it lasts all day.   Patient's last menstrual period was 02/14/2024.    Pain Score:   0 - No pain    Health Maintenance Review: Patient reminded of \"due or due soon\" health maintenance. I have asked the patient to contact his/her primary care provider (PCP) for follow-up on his/her health maintenance.    \"Have you been to the ER, urgent care clinic since your last visit?  Hospitalized since your last visit?\"    YES - When: approximately 3 months ago.  Where and Why: March 16, 2024 [facility] Abdominal pain. Patient went again a few days later to [facility] for abdominal pain. The next week she visited [facility] for abdominal pain.     “Have you seen or consulted any other health care providers outside of [facility] since your last visit?”    NO      Per verbal order from provider, patient was called at 1530. Patient was given instructions per provider regarding the change to start NPH 8 units nightly. Patient was in agreement and stated that she fully understood. Patient was also asked to keep a glucose log of her checks 4x daily.   Sirena \"Bladen\" DIEGO Gates

## 2024-06-13 ENCOUNTER — PATIENT MESSAGE (OUTPATIENT)
Age: 34
End: 2024-06-13

## 2024-06-13 NOTE — TELEPHONE ENCOUNTER
Mr. Galicia left me a message through perfect serve regarding his wife's Humulin prescription.  Apparently, their insurance company will not cover Humulin but it will cover Novolin.  A few minutes ago, I called the pharmacy, [facility] [place], spoke to the pharmacist and changed ther prescription to Novolin N pen 8 units subcu nightly.  I also prescribed refills.

## 2024-06-13 NOTE — PROGRESS NOTES
I just spoke to Mr. Galicia and gave him an update regarding his wife's prescription.  I had called the pharmacy and changed the prescription from Humulin N to Novolin N as he requested.

## 2024-06-14 ENCOUNTER — TELEMEDICINE (OUTPATIENT)
Age: 34
End: 2024-06-14

## 2024-06-14 DIAGNOSIS — O24.414 INSULIN CONTROLLED WHITE CLASSIFICATION A2 GESTATIONAL DIABETES MELLITUS (GDM): Primary | ICD-10-CM

## 2024-06-14 NOTE — PROGRESS NOTES
During virtual visit today, verbal and written teaching provided on insulin pens and needles, storage and disposal, signs and symptoms of hyperglycemia, hypoglycemia, and treatments. Guidelines for nutrition provided on the importance of not skipping meals and eating a bedtime snack high in protein nightly.  Demonstration was provided to the patient on how to use an insulin pen and administer subcutaneously using a demo pen and fat pad. Patient able to verbalize understanding.      Patient has been prescribed NPH at bedtime.  New medication education provided to patient.  Prescription for insulin pen needles called in to the patient's preferred pharmacy x1 refill - [facility]. (Per patient she has already received pen).     Patient verbalized understanding of all the above. All questions were answered.   
be in contact to schedule an appointment. Requested patient to keep a food journal to review during appointment. Patient instructed to call our office if they haven't heard from Diabetic Ed within one business week.     Prescriptions sent to patient's preferred pharmacy for glucometer, lancets, and strips. Patient verbalized understanding of the all the above. All questions were answered.        Objective   Physical Exam  Vitals reviewed. Nursing note reviewed: vitals trend reviewed.  Constitutional:       Appearance: Normal appearance.   Neurological:      Mental Status: She is alert.   Psychiatric:         Mood and Affect: Mood normal.         Judgment: Judgment normal.       Ana Cristina Galicia, was evaluated through a synchronous (real-time) audio-video encounter. The patient (or guardian if applicable) is aware that this is a billable service, which includes applicable co-pays. This Virtual Visit was conducted with patient's (and/or legal guardian's) consent. Patient identification was verified, and a caregiver was present when appropriate.   The patient was located at Home: [address] [place] [id number]  Provider was located at Facility (Appt Dept): [address] [place] [address]  [address] [place] [id number]  Confirm you are appropriately licensed, registered, or certified to deliver care in the state where the patient is located as indicated above. If you are not or unsure, please re-schedule the visit: Yes, I confirm.        Total time spent for this encounter:  35min    --VIC Fajardo CNP on 6/14/2024 at 5:41 PM    An electronic signature was used to authenticate this note.        On this date 6/14/2024 I have spent 35 minutes reviewing previous notes, test results and face to face with the patient discussing the diagnosis and importance of compliance with the treatment plan as well as documenting on the day of the visit.      An electronic signature was used to authenticate this

## 2024-07-01 ENCOUNTER — ROUTINE PRENATAL (OUTPATIENT)
Age: 34
End: 2024-07-01
Payer: COMMERCIAL

## 2024-07-01 VITALS
WEIGHT: 172.5 LBS | SYSTOLIC BLOOD PRESSURE: 115 MMHG | BODY MASS INDEX: 27.02 KG/M2 | DIASTOLIC BLOOD PRESSURE: 76 MMHG | HEART RATE: 93 BPM

## 2024-07-01 DIAGNOSIS — O09.512 AMA (ADVANCED MATERNAL AGE) PRIMIGRAVIDA 35+, SECOND TRIMESTER: ICD-10-CM

## 2024-07-01 DIAGNOSIS — O24.414 INSULIN CONTROLLED WHITE CLASSIFICATION A2 GESTATIONAL DIABETES MELLITUS (GDM): ICD-10-CM

## 2024-07-01 DIAGNOSIS — O24.419 GESTATIONAL DIABETES MELLITUS (GDM), ANTEPARTUM, GESTATIONAL DIABETES METHOD OF CONTROL UNSPECIFIED: Primary | ICD-10-CM

## 2024-07-01 DIAGNOSIS — O24.414 INSULIN CONTROLLED WHITE CLASSIFICATION A2 GESTATIONAL DIABETES MELLITUS (GDM): Primary | ICD-10-CM

## 2024-07-01 PROCEDURE — 99213 OFFICE O/P EST LOW 20 MIN: CPT

## 2024-07-01 PROCEDURE — 76816 OB US FOLLOW-UP PER FETUS: CPT | Performed by: OBSTETRICS & GYNECOLOGY

## 2024-07-01 NOTE — PROGRESS NOTES
Assessment & Plan   ASSESSMENT/PLAN:  1. Insulin controlled White classification A2 gestational diabetes mellitus (GDM)  2. AMA (advanced maternal age) primigravida 35+, second trimester    Ana Cristina is a 34 y/o  seen for the following:     A2 GDM/Hx gastroparesis   - Glucose log reviewed: Fasting and 2hr pp overall controlled on current regimen of Novolin N 8 units at bedtime.  -PIH and PTL precautions reviewed.      AMA  -LR NIPT   -Taking ldASA      Hx SGA/(Possible size/date discrepancy)  -EFW 8% AC 26% on 6/10; today EFW 10% AC 32%  -Reviewed Medical records 3/21/24 transvaginal ultrasound: no CRL provided to calculate DEUCE. Thus DEUCE is based on LMP.     Recommendations  -F/U 4 weeks subop views/growth.   -Begin weekly  testing at 32 weeks.   -Obtain CMP, maternal urinalysis and urine culture and sensitivity, Urine protein to creatinine ratio and urine for microalbuminuria, hemoglobin A1c, TSH with reflex.-No new labs as of 24  Delivery TBD.     Please see Viewpoint for ultrasound findings.      Subjective   Ana Cristina Galicia (:  1990) is a 33 y.o. female,Established patient, here for evaluation of the following chief complaint(s):  1. Insulin controlled White classification A2 gestational diabetes mellitus (GDM)  2. AMA (advanced maternal age) primigravida 35+, second trimester       Objective   Physical Exam  Vitals reviewed.   Constitutional:       Appearance: Normal appearance.   Neurological:      Mental Status: She is alert.   Psychiatric:         Mood and Affect: Mood normal.         Judgment: Judgment normal.      On this date 2024 I have spent 25 minutes reviewing previous notes, test results and face to face with the patient discussing the diagnosis and importance of compliance with the treatment plan as well as documenting on the day of the visit.      An electronic signature was used to authenticate this note.    --VIC Fajardo - CNP

## 2024-07-02 NOTE — PROCEDURES
PATIENT: DEON KOEHLER   -  : 1990   -  DOS:2024   -  INTERPRETING PROVIDER:Amanda Acuña,   Indication  ========    Advanced maternal age, GDM, Class A1 versus class A-II versus type 2 diabetes mellitus, possible SGA fetus versus size/date discrepancy.    Method  ======    Transabdominal ultrasound examination. View: Suboptimal view: limited by fetal position    Pregnancy  =========    Sutherland pregnancy. Number of fetuses: 1    Dating  ======    LMP on: 2024  GA by LMP 19 w + 5 d  DEUCE by LMP: 2024  Ultrasound examination on: 2024  GA by U/S based upon: AC, BPD, Femur, HC  GA by U/S 19 w + 0 d  DEUCE by U/S: 2024  Assigned: based on the LMP, selected on 06/10/2024  Assigned GA 19 w + 5 d  Assigned DEUCE: 2024    Fetal Biometry  ============    Standard  BPD 44.0 mm 19w 2d 33% Hadlock  OFD 58.8 mm 20w 3d 76% Kenney  .6 mm 19w 2d 22% Hadlock  Cerebellum tr 19.3 mm 18w 5d 30% Hill  .5 mm 19w 2d 32% Hadlock  Femur 27.0 mm 18w 2d 5% Hadlock  Humerus 26.5 mm 18w 3d 11% Kenney   g 18w 6d 10% Hadlock  EFW (lb) 0 lb  EFW (oz) 9 oz  EFW by: Hadlock (BPD-HC-AC-FL)  Extended   5.7 mm  CM 5.3 mm  62% Nicolaides  Other Structures   bpm    General Evaluation  ==============    Cardiac activity present.  bpm. Fetal movements: visualized. Presentation: Cephalic  Placenta: Placental site: Anterior  Umbilical cord: Cord vessels: 3 vessel cord. Insertion site: central  Amniotic fluid: Amount of AF: normal. MVP 4.8 cm    Fetal Anatomy  ===========    Cavum septi pellucidi: SUBOPTIMAL  Lips: normal  Nose: normal  4-chamber view: SUBOPTIMAL  RVOT view: normal  LVOT view: normal  3-vessel view: normal  3-vessel-trachea view: normal  Heart / Thorax  Aortic arch view: normal  Bicaval view: normal  Ductal arch view: normal  Stomach: normal  Kidneys: normal  Bladder: normal  Sacral spine: normal  Wants to know fetal sex: yes    Maternal

## 2024-07-16 ENCOUNTER — TELEPHONE (OUTPATIENT)
Age: 34
End: 2024-07-16

## 2024-07-16 NOTE — TELEPHONE ENCOUNTER
TC to Ana Cristina Galicia  re: Increase BG reading and nausea after mistakenly eating high carb biscuit last night.  Fasting today 95 and 2 hr pp lunch <120 on current regimen of Novolin N 8 units at bedtime.  Pt is hydrating and eating increased protein today.  Pt was out of town and denies sick contacts or contacts with similar symptoms.  Denies treatment for gastroparesis for several years.    Pt confirms starting to feel better.  Pt denied hypoglycemia. Discussed continued blood glucose monitoring and to report hypo/hyper glycemia to our clinic for assessment and adjustments. Pt aware to report to the ED for increased S/S.  Pt agrees with this plan.  All questions answered.  Next general practitioner appt 7/19/24.  Next MFM appt 7/29/24.  Pt aware to submit glucose logs weekly. [facility].       ----- Message from Karlene Knapp RN sent at 7/16/2024  3:32 PM EDT -----  Regarding: FW: Blood Sugar Log Review`  Contact: [phone]    ----- Message -----  From: Ana Cristina Galicia  Sent: 7/16/2024   2:48 PM EDT  To: #  Subject: Blood Sugar Log Review`                          We have called the office twice as I have been feeling ill since that 176 reading last night. I am not able to go to work and vomited around 12:15 today. My blood sugar has been in range after breakfast and lunch. My fasting number was 95 when I woke due to nausea around 4:15 am. We are not sure how to proceed. I have been drinking water and walked and ensured high protein meals today.

## 2024-07-23 ENCOUNTER — PATIENT MESSAGE (OUTPATIENT)
Age: 34
End: 2024-07-23

## 2024-07-29 ENCOUNTER — ROUTINE PRENATAL (OUTPATIENT)
Age: 34
End: 2024-07-29
Payer: COMMERCIAL

## 2024-07-29 VITALS — DIASTOLIC BLOOD PRESSURE: 73 MMHG | SYSTOLIC BLOOD PRESSURE: 120 MMHG | HEART RATE: 87 BPM

## 2024-07-29 DIAGNOSIS — O36.5920 SGA (SMALL FOR GESTATIONAL AGE), FETAL, AFFECTING CARE OF MOTHER, ANTEPARTUM, SECOND TRIMESTER, NOT APPLICABLE OR UNSPECIFIED FETUS: ICD-10-CM

## 2024-07-29 DIAGNOSIS — O09.512 AMA (ADVANCED MATERNAL AGE) PRIMIGRAVIDA 35+, SECOND TRIMESTER: ICD-10-CM

## 2024-07-29 DIAGNOSIS — Z3A.23 23 WEEKS GESTATION OF PREGNANCY: ICD-10-CM

## 2024-07-29 DIAGNOSIS — O24.414 INSULIN CONTROLLED GESTATIONAL DIABETES MELLITUS (GDM) DURING PREGNANCY, ANTEPARTUM: Primary | ICD-10-CM

## 2024-07-29 PROCEDURE — 99213 OFFICE O/P EST LOW 20 MIN: CPT | Performed by: OBSTETRICS & GYNECOLOGY

## 2024-07-29 PROCEDURE — 76816 OB US FOLLOW-UP PER FETUS: CPT | Performed by: OBSTETRICS & GYNECOLOGY

## 2024-07-29 NOTE — PROCEDURES
PATIENT: DEON KOEHLER   -  : 1990   -  DOS:2024   -  INTERPRETING PROVIDER:Doni Castro,   Indication  ========    Advanced maternal age, GDM, Class A-II versus type 2 diabetes mellitus, possible SGA fetus versus size/date discrepancy.    Method  ======    Transabdominal ultrasound examination. View: Sufficient    Pregnancy  =========    Sutherland pregnancy. Number of fetuses: 1    Dating  ======    LMP on: 2024  GA by LMP 23 w + 5 d  DEUCE by LMP: 2024  Ultrasound examination on: 2024  GA by U/S based upon: AC, BPD, Femur, HC  GA by U/S 22 w + 4 d  DEUCE by U/S: 2024  Assigned: based on the LMP, selected on 06/10/2024  Assigned GA 23 w + 5 d  Assigned DEUCE: 2024    Fetal Biometry  ============    Standard  BPD 54.1 mm 22w 3d 8% Hadlock  OFD 71.2 mm 23w 5d 51% Kenney  .0 mm 22w 0d 1% Hadlock  Cerebellum tr 23.8 mm 21w 6d 19% Hill  .7 mm 22w 5d 15% Hadlock  Femur 40.8 mm 23w 2d 23% Hadlock   g 22w 5d 12% Hadlock  EFW (lb) 1 lb  EFW (oz) 3 oz  EFW by: Hadlock (BPD-HC-AC-FL)  Extended   5.2 mm  CM 5.8 mm  48% Nicolaides  Other Structures   bpm    General Evaluation  ==============    Cardiac activity present.  bpm. Fetal movements: visualized. Presentation: Cephalic  Placenta: Placental site: Anterior  Umbilical cord: Cord vessels: 3 vessel cord. Insertion site: central  Amniotic fluid: Amount of AF: normal. MVP 5.4 cm    Fetal Anatomy  ===========    Lateral ventricles: documented previously  Cavum septi pellucidi: normal  Cerebellum: documented previously  Cisterna magna: documented previously  4-chamber view: SUBOPTIMAL  LVOT view: documented previously  Heart / Thorax  Cardiac rhythm: regular (normal)  Stomach: normal  Kidneys: normal  Bladder: normal  Wants to know fetal sex: yes    Findings  =======    Intrauterine Sutherland pregnancy at 23w 5d by clinical dates.  EFW is 540 g at 12%, abdominal circumference at 15%.  Anatomy

## 2024-08-19 ENCOUNTER — ROUTINE PRENATAL (OUTPATIENT)
Age: 34
End: 2024-08-19
Payer: COMMERCIAL

## 2024-08-19 VITALS
BODY MASS INDEX: 27.46 KG/M2 | SYSTOLIC BLOOD PRESSURE: 115 MMHG | HEART RATE: 96 BPM | DIASTOLIC BLOOD PRESSURE: 77 MMHG | WEIGHT: 175.3 LBS

## 2024-08-19 DIAGNOSIS — Z3A.26 26 WEEKS GESTATION OF PREGNANCY: Primary | ICD-10-CM

## 2024-08-19 PROCEDURE — 76816 OB US FOLLOW-UP PER FETUS: CPT | Performed by: OBSTETRICS & GYNECOLOGY

## 2024-08-19 PROCEDURE — 76820 UMBILICAL ARTERY ECHO: CPT | Performed by: OBSTETRICS & GYNECOLOGY

## 2024-08-19 NOTE — PROCEDURES
PATIENT: DEON KOEHLER   -  : 1990   -  DOS:2024   -  INTERPRETING PROVIDER:Amanda Acuña,   Indication  ========    Advanced maternal age, GDM, Class A-II versus type 2 diabetes mellitus    Method  ======    Transabdominal ultrasound examination. View: Good view    Pregnancy  =========    Sutherland pregnancy. Number of fetuses: 1    Dating  ======    LMP on: 2024  GA by LMP 26 w + 5 d  DEUCE by LMP: 2024  Ultrasound examination on: 2024  GA by U/S based upon: AC, BPD, Femur, HC  GA by U/S 25 w + 4 d  DEUCE by U/S: 2024  Assigned: based on the LMP, selected on 06/10/2024  Assigned GA 26 w + 5 d  Assigned DEUCE: 2024    Fetal Biometry  ============    Standard  BPD 62.7 mm 25w 3d 7% Hadlock  OFD 85.2 mm 27w 4d 76% Kenney  .8 mm 25w 5d 5% Hadlock  Cerebellum tr 29.1 mm 25w 3d 33% Hill  .1 mm 25w 4d 12% Hadlock  Femur 46.2 mm 25w 2d 7% Hadlock   g 25w 2d 7% Hadlock  EFW (lb) 1 lb  EFW (oz) 13 oz  EFW by: Hadlock (BPD-HC-AC-FL)  Extended   3.2 mm  CM 6.9 mm  64% Nicolaides  Other Structures   bpm    General Evaluation  ==============    Cardiac activity present.  bpm. Fetal movements: visualized. Presentation: BREECH  Placenta: Placental site: Anterior  Umbilical cord: Cord vessels: 3 vessel cord. Insertion site: central  Amniotic fluid: Amount of AF: normal. MVP 5.6 cm. JOSÉ 18.7 cm. Q1 5.6 cm, Q2 4.9 cm, Q3 3.6 cm, Q4 4.6 cm    Fetal Anatomy  ===========    Lateral ventricles: documented previously  Cavum septi pellucidi: normal  Cerebellum: documented previously  Cisterna magna: documented previously  4-chamber view: SUBOPTIMAL  LVOT view: documented previously  Heart / Thorax  Cardiac rhythm: regular (normal)  Stomach: normal  Kidneys: normal  Bladder: normal  Wants to know fetal sex: yes    Fetal Doppler  ===========    Arterial  Umbilical artery: normal  Umbilical A sampling site: midcord  Umbilical A PI 1.04  53% Ebbing  Umbilical A

## 2024-08-27 ENCOUNTER — ROUTINE PRENATAL (OUTPATIENT)
Age: 34
End: 2024-08-27
Payer: COMMERCIAL

## 2024-08-27 VITALS — SYSTOLIC BLOOD PRESSURE: 115 MMHG | DIASTOLIC BLOOD PRESSURE: 77 MMHG | HEART RATE: 85 BPM

## 2024-08-27 DIAGNOSIS — O24.419 GESTATIONAL DIABETES MELLITUS (GDM), ANTEPARTUM, GESTATIONAL DIABETES METHOD OF CONTROL UNSPECIFIED: Primary | ICD-10-CM

## 2024-08-27 PROCEDURE — 76820 UMBILICAL ARTERY ECHO: CPT | Performed by: OBSTETRICS & GYNECOLOGY

## 2024-08-27 PROCEDURE — 99214 OFFICE O/P EST MOD 30 MIN: CPT | Performed by: OBSTETRICS & GYNECOLOGY

## 2024-08-27 PROCEDURE — 76819 FETAL BIOPHYS PROFIL W/O NST: CPT | Performed by: OBSTETRICS & GYNECOLOGY

## 2024-08-27 NOTE — PROCEDURES
PATIENT: DEON KOEHLER   -  : 1990   -  DOS:2024   -  INTERPRETING PROVIDER:Marek Mares,   Indication  ========    Advanced maternal age, GDM, Class A-II versus type 2 diabetes mellitus, FGR    Method  ======    Transabdominal ultrasound examination. View: Sufficient    Pregnancy  =========    Sutherland pregnancy. Number of fetuses: 1    Dating  ======    LMP on: 2024  Cycle: LMP date uncertain  GA by LMP 27 w + 6 d  DEUCE by LMP: 2024  Previous Ultrasound on: 2024  Type of prior assessment: GA  GA at prior assessment date 8 w + 6 d  GA by previous U/S 27 w + 0 d  DEUCE by previous Ultrasound: 2024  Assigned: based on ultrasound (GA), selected on 2024  Assigned GA 27 w + 0 d  Assigned DEUCE: 2024    General Evaluation  ==============    Cardiac activity present.  bpm. Fetal movements: visualized. Presentation: Cephalic  Placenta: Placental site: Anterior  Umbilical cord: Cord vessels: 3 vessel cord    Fetal Anatomy  ===========    4-chamber view: normal  Stomach: normal  Bladder: normal  Wants to know fetal sex: yes    Amniotic Fluid Assessment  =====================    Amount of AF: polyhydramnios  MVP 8.1 cm. JOSÉ 24.7 cm. Q1 8.1 cm, Q2 5.1 cm, Q3 5.9 cm, Q4 5.6 cm    Biophysical Profile  ==============    2: Fetal breathing movements  2: Gross body movements  2: Fetal tone  2: Amniotic fluid volume   Biophysical profile score    Fetal Doppler  ===========    Arterial  Umbilical artery: normal  Umbilical A PI 1.14 76% Ebbing  Umbilical A RI 0.73 79% Zunilda  Umbilical A PS 46.13 cm/s  Umbilical A ED 13.52 cm/s  Umbilical A EDF: positive  Umbilical A TAmax 28.57 cm/s  Umbilical A MD 12.29 cm/s  Umbilical A S / D 3.70 76% Zunilda  Umbilical A  bpm    Findings  =======    Intrauterine Sutherland pregnancy at 27w 0d by clinical dates.  Amniotic fluid: borderline polyhydramnios  Placenta is Anterior.  Biophysical profile score is 8/8.  Cephalic

## 2024-09-13 ENCOUNTER — TELEPHONE (OUTPATIENT)
Age: 34
End: 2024-09-13

## 2024-10-11 ENCOUNTER — CLINICAL DOCUMENTATION (OUTPATIENT)
Age: 34
End: 2024-10-11

## 2024-10-11 ENCOUNTER — NURSE ONLY (OUTPATIENT)
Age: 34
End: 2024-10-11

## 2024-10-11 NOTE — PROGRESS NOTES
0915 left message with patient to please call MFM and to send updated blood sugar logs to RN.     5508 Left message with Trinidad (nurse from [facility]) and asked for her to return my call. Would like to follow up with Ob practice in regards to how patient's blood glucose levels have been

## 2024-11-06 LAB — GBS, EXTERNAL RESULT: NEGATIVE

## 2024-11-18 ENCOUNTER — HOSPITAL ENCOUNTER (INPATIENT)
Facility: HOSPITAL | Age: 34
LOS: 3 days | Discharge: HOME | End: 2024-11-21
Attending: OBSTETRICS & GYNECOLOGY | Admitting: OBSTETRICS & GYNECOLOGY
Payer: COMMERCIAL

## 2024-11-18 ENCOUNTER — ANESTHESIA (OUTPATIENT)
Facility: HOSPITAL | Age: 34
End: 2024-11-18
Payer: COMMERCIAL

## 2024-11-18 ENCOUNTER — ANESTHESIA EVENT (OUTPATIENT)
Facility: HOSPITAL | Age: 34
End: 2024-11-18
Payer: COMMERCIAL

## 2024-11-18 PROBLEM — O24.419 GESTATIONAL DIABETES: Status: ACTIVE | Noted: 2024-11-18

## 2024-11-18 LAB
ABO + RH BLD: NORMAL
ALBUMIN SERPL-MCNC: 2.9 G/DL (ref 3.5–5)
ALBUMIN/GLOB SERPL: 0.9 (ref 1.1–2.2)
ALP SERPL-CCNC: 169 U/L (ref 45–117)
ALT SERPL-CCNC: 16 U/L (ref 12–78)
ANION GAP SERPL CALC-SCNC: 7 MMOL/L (ref 2–12)
AST SERPL-CCNC: 23 U/L (ref 15–37)
BILIRUB SERPL-MCNC: 0.2 MG/DL (ref 0.2–1)
BLOOD GROUP ANTIBODIES SERPL: NORMAL
BUN SERPL-MCNC: 18 MG/DL (ref 6–20)
BUN/CREAT SERPL: 28 (ref 12–20)
CALCIUM SERPL-MCNC: 9.6 MG/DL (ref 8.5–10.1)
CHLORIDE SERPL-SCNC: 103 MMOL/L (ref 97–108)
CO2 SERPL-SCNC: 26 MMOL/L (ref 21–32)
CREAT SERPL-MCNC: 0.65 MG/DL (ref 0.55–1.02)
CREAT UR-MCNC: 113 MG/DL
ERYTHROCYTE [DISTWIDTH] IN BLOOD BY AUTOMATED COUNT: 14.3 % (ref 11.5–14.5)
GLOBULIN SER CALC-MCNC: 3.3 G/DL (ref 2–4)
GLUCOSE BLD STRIP.AUTO-MCNC: 75 MG/DL (ref 65–117)
GLUCOSE BLD STRIP.AUTO-MCNC: 93 MG/DL (ref 65–117)
GLUCOSE SERPL-MCNC: 89 MG/DL (ref 65–100)
HCT VFR BLD AUTO: 38.4 % (ref 35–47)
HGB BLD-MCNC: 12.9 G/DL (ref 11.5–16)
MCH RBC QN AUTO: 29.5 PG (ref 26–34)
MCHC RBC AUTO-ENTMCNC: 33.6 G/DL (ref 30–36.5)
MCV RBC AUTO: 87.9 FL (ref 80–99)
NRBC # BLD: 0 K/UL (ref 0–0.01)
NRBC BLD-RTO: 0 PER 100 WBC
PLATELET # BLD AUTO: 212 K/UL (ref 150–400)
PMV BLD AUTO: 10.3 FL (ref 8.9–12.9)
POTASSIUM SERPL-SCNC: 4.2 MMOL/L (ref 3.5–5.1)
PROT SERPL-MCNC: 6.2 G/DL (ref 6.4–8.2)
PROT UR-MCNC: 26 MG/DL (ref 0–11.9)
PROT/CREAT UR-RTO: 0.2
RBC # BLD AUTO: 4.37 M/UL (ref 3.8–5.2)
RPR SER QL: NONREACTIVE
SERVICE CMNT-IMP: NORMAL
SERVICE CMNT-IMP: NORMAL
SODIUM SERPL-SCNC: 136 MMOL/L (ref 136–145)
SPECIMEN EXP DATE BLD: NORMAL
WBC # BLD AUTO: 7.1 K/UL (ref 3.6–11)

## 2024-11-18 PROCEDURE — 86901 BLOOD TYPING SEROLOGIC RH(D): CPT

## 2024-11-18 PROCEDURE — 7210000100 HC LABOR FEE PER 1 HR

## 2024-11-18 PROCEDURE — 1100000000 HC RM PRIVATE

## 2024-11-18 PROCEDURE — 36415 COLL VENOUS BLD VENIPUNCTURE: CPT

## 2024-11-18 PROCEDURE — 80053 COMPREHEN METABOLIC PANEL: CPT

## 2024-11-18 PROCEDURE — 82570 ASSAY OF URINE CREATININE: CPT

## 2024-11-18 PROCEDURE — 59200 INSERT CERVICAL DILATOR: CPT

## 2024-11-18 PROCEDURE — 86850 RBC ANTIBODY SCREEN: CPT

## 2024-11-18 PROCEDURE — 86592 SYPHILIS TEST NON-TREP QUAL: CPT

## 2024-11-18 PROCEDURE — 6360000002 HC RX W HCPCS: Performed by: STUDENT IN AN ORGANIZED HEALTH CARE EDUCATION/TRAINING PROGRAM

## 2024-11-18 PROCEDURE — C1726 CATH, BAL DIL, NON-VASCULAR: HCPCS

## 2024-11-18 PROCEDURE — 86900 BLOOD TYPING SEROLOGIC ABO: CPT

## 2024-11-18 PROCEDURE — 82962 GLUCOSE BLOOD TEST: CPT

## 2024-11-18 PROCEDURE — 6370000000 HC RX 637 (ALT 250 FOR IP): Performed by: OBSTETRICS & GYNECOLOGY

## 2024-11-18 PROCEDURE — 84156 ASSAY OF PROTEIN URINE: CPT

## 2024-11-18 PROCEDURE — 85027 COMPLETE CBC AUTOMATED: CPT

## 2024-11-18 PROCEDURE — 6360000002 HC RX W HCPCS: Performed by: OBSTETRICS & GYNECOLOGY

## 2024-11-18 RX ORDER — NALBUPHINE HYDROCHLORIDE 10 MG/ML
10 INJECTION INTRAMUSCULAR; INTRAVENOUS; SUBCUTANEOUS
Status: COMPLETED | OUTPATIENT
Start: 2024-11-18 | End: 2024-11-18

## 2024-11-18 RX ORDER — NALOXONE HYDROCHLORIDE 0.4 MG/ML
INJECTION, SOLUTION INTRAMUSCULAR; INTRAVENOUS; SUBCUTANEOUS PRN
Status: DISCONTINUED | OUTPATIENT
Start: 2024-11-18 | End: 2024-11-21 | Stop reason: HOSPADM

## 2024-11-18 RX ORDER — EPHEDRINE SULFATE 50 MG/ML
5 INJECTION INTRAVENOUS PRN
Status: DISCONTINUED | OUTPATIENT
Start: 2024-11-19 | End: 2024-11-21 | Stop reason: HOSPADM

## 2024-11-18 RX ORDER — EPHEDRINE SULFATE 50 MG/ML
10 INJECTION INTRAVENOUS
Status: ACTIVE | OUTPATIENT
Start: 2024-11-18 | End: 2024-11-19

## 2024-11-18 RX ORDER — FENTANYL/BUPIVACAINE/NS/PF 2-1250MCG
1-15 PLASTIC BAG, INJECTION (ML) INJECTION CONTINUOUS
Status: DISCONTINUED | OUTPATIENT
Start: 2024-11-18 | End: 2024-11-21 | Stop reason: HOSPADM

## 2024-11-18 RX ORDER — SODIUM CHLORIDE, SODIUM LACTATE, POTASSIUM CHLORIDE, AND CALCIUM CHLORIDE .6; .31; .03; .02 G/100ML; G/100ML; G/100ML; G/100ML
500 INJECTION, SOLUTION INTRAVENOUS PRN
Status: DISCONTINUED | OUTPATIENT
Start: 2024-11-18 | End: 2024-11-21 | Stop reason: HOSPADM

## 2024-11-18 RX ORDER — INSULIN LISPRO 100 [IU]/ML
0-8 INJECTION, SOLUTION INTRAVENOUS; SUBCUTANEOUS
Status: DISCONTINUED | OUTPATIENT
Start: 2024-11-18 | End: 2024-11-18

## 2024-11-18 RX ORDER — NALBUPHINE HYDROCHLORIDE 10 MG/ML
5 INJECTION INTRAMUSCULAR; INTRAVENOUS; SUBCUTANEOUS EVERY 4 HOURS PRN
Status: DISCONTINUED | OUTPATIENT
Start: 2024-11-18 | End: 2024-11-21 | Stop reason: HOSPADM

## 2024-11-18 RX ORDER — INSULIN LISPRO 100 [IU]/ML
0-8 INJECTION, SOLUTION INTRAVENOUS; SUBCUTANEOUS 3 TIMES DAILY
Status: DISCONTINUED | OUTPATIENT
Start: 2024-11-18 | End: 2024-11-21 | Stop reason: HOSPADM

## 2024-11-18 RX ORDER — ONDANSETRON 2 MG/ML
4 INJECTION INTRAMUSCULAR; INTRAVENOUS EVERY 6 HOURS PRN
Status: DISCONTINUED | OUTPATIENT
Start: 2024-11-18 | End: 2024-11-19 | Stop reason: SDUPTHER

## 2024-11-18 RX ORDER — SODIUM CHLORIDE 0.9 % (FLUSH) 0.9 %
5-40 SYRINGE (ML) INJECTION EVERY 12 HOURS SCHEDULED
Status: DISCONTINUED | OUTPATIENT
Start: 2024-11-18 | End: 2024-11-21 | Stop reason: HOSPADM

## 2024-11-18 RX ORDER — TERBUTALINE SULFATE 1 MG/ML
0.25 INJECTION, SOLUTION SUBCUTANEOUS
Status: ACTIVE | OUTPATIENT
Start: 2024-11-18 | End: 2024-11-19

## 2024-11-18 RX ORDER — SODIUM CHLORIDE 9 MG/ML
25 INJECTION, SOLUTION INTRAVENOUS PRN
Status: DISCONTINUED | OUTPATIENT
Start: 2024-11-18 | End: 2024-11-21 | Stop reason: HOSPADM

## 2024-11-18 RX ORDER — SODIUM CHLORIDE 0.9 % (FLUSH) 0.9 %
5-40 SYRINGE (ML) INJECTION PRN
Status: DISCONTINUED | OUTPATIENT
Start: 2024-11-18 | End: 2024-11-21 | Stop reason: HOSPADM

## 2024-11-18 RX ORDER — DEXTROSE MONOHYDRATE 100 MG/ML
INJECTION, SOLUTION INTRAVENOUS CONTINUOUS PRN
Status: DISCONTINUED | OUTPATIENT
Start: 2024-11-18 | End: 2024-11-21 | Stop reason: HOSPADM

## 2024-11-18 RX ADMIN — Medication 12.5 MG: at 20:52

## 2024-11-18 RX ADMIN — Medication 25 MCG: at 17:26

## 2024-11-18 RX ADMIN — INSULIN HUMAN 15 UNITS: 100 INJECTION, SUSPENSION SUBCUTANEOUS at 21:28

## 2024-11-18 RX ADMIN — Medication 25 MCG: at 21:53

## 2024-11-18 RX ADMIN — NALBUPHINE HYDROCHLORIDE 10 MG: 10 INJECTION, SOLUTION INTRAMUSCULAR; INTRAVENOUS; SUBCUTANEOUS at 17:29

## 2024-11-18 RX ADMIN — ONDANSETRON 4 MG: 2 INJECTION INTRAMUSCULAR; INTRAVENOUS at 19:53

## 2024-11-18 ASSESSMENT — PAIN SCALES - GENERAL: PAINLEVEL_OUTOF10: 6

## 2024-11-18 NOTE — PROGRESS NOTES
Spiritual Health History and Assessment/Progress Note  [facility]    Advance Care Planning, Initial Encounter,  ,  ,      Name: Ana Cristina Galicia MRN: [id number]    Age: 34 y.o.     Sex: female   Language: English   Confucianist: Samaritan   Gestational diabetes     Date: 11/18/2024            Total Time Calculated: 60 min              Spiritual Assessment began in [facility] 3 LABOR & DELIVERY        Referral/Consult From: Nurse   Encounter Overview/Reason: Advance Care Planning, Initial Encounter  Service Provided For: Patient and family together- Patient and her .     Elly, Belief, Meaning:   Patient is connected with a elly tradition or spiritual practice and has beliefs or practices that help with coping during difficult times- Samaritan elly  Family/Friends are connected with a elly tradition or spiritual practice and have beliefs or practices that help with coping during difficult times- Samaritan elly      Importance and Influence:  Patient has spiritual/personal beliefs that influence decisions regarding their health  Family/Friends have spiritual/personal beliefs that influence decisions regarding the patient's health    Community:  Patient is connected with a spiritual community and feels well-supported. Support system includes: Spouse/Partner, Parent/s, Friends, and Extended family  Family/Friends are connected with a spiritual community: and feel well-supported. Support system includes: Spouse/Partner, Parent/s, Friends, and Extended family    Assessment and Plan of Care:     Patient Interventions include: Facilitated expression of thoughts and feelings, Engaged in theological reflection, Affirmed coping skills/support systems, and Assisted in Advance Care Planning conversation- Patient completed Advanced Medical Directive.  Family/Friends Interventions include: Facilitated expression of thoughts and feelings, Affirmed coping skills/support systems, and Assisted in Advance Care Planning

## 2024-11-18 NOTE — H&P
History & Physical    Name: Ana Cristina Galicia MRN: [id number]  SSN: [id number]    YOB: 1990  Age: 34 y.o.  Sex: female      Subjective: Induction     Estimated Date of Delivery: 24  OB History          1    Para        Term                AB        Living             SAB        IAB        Ectopic        Molar        Multiple        Live Births                    Ms. Galicia is a 34 y.o.  at 38w6d  admitted for IOL 2/2 GHTN and gestational diabetes on insuliln. Patient initially had IOL scheduled for 24; however, patient seen in the office today for routine prenatal visit and had BP of 148/93 that repeated to 141/92 so sent to L&D for induction.    Denies contractions, LOF, or vaginal bleeding. Reports normal fetal movement. Denies She denies HA, visual changes, SOB, mid epigastric pain, or RUQ pain.     Patient and her  Pierce are excited to welcome a baby boy. They have a  named \"Nora Peaalejandro\"    Prenatal course   GDMA2 -  NPH 30 units qHS  Pre- diabetes - HgbA1c of 5.7 at initial OB  AMA - on 81mg ASA     Baby boy- Desires circ   S/p RSV vax on  and Tdap on     Past Medical History:   Diagnosis Date    Anxiety     Depression     Dysmenorrhea     Endometriosis     Gestational diabetes     Gestational hypertension     Migraines     Seasonal allergies      Past Surgical History:   Procedure Laterality Date    TONSILLECTOMY  2013    WISDOM TOOTH EXTRACTION Bilateral 2008    WRIST SURGERY      to release a tendon     Social History     Occupational History    Not on file   Tobacco Use    Smoking status: Never    Smokeless tobacco: Never   Vaping Use    Vaping status: Never Used   Substance and Sexual Activity    Alcohol use: Not Currently    Drug use: Never    Sexual activity: Yes     Partners: Male     Birth control/protection: Pill     Family History   Problem Relation Age of Onset    Coronary Art Dis Maternal Grandfather        Allergies   Allergen  [Follow - Up] : a follow-up visit [DM Type 2] : DM Type 2 [Thyroid nodule/ MNG] : thyroid nodule/ MNG

## 2024-11-18 NOTE — ACP (ADVANCE CARE PLANNING)
Advance Care Planning     Advance Care Planning Inpatient Note  [facility] Department    Today's Date: 11/18/2024  Unit: [facility] 3 LABOR & DELIVERY    Received request from IDT Member.  Upon review of chart and communication with care team, patient's decision making abilities are not in question.. Patient and Spouse was/were present in the room during visit.    Goals of ACP Conversation:  Discuss advance care planning documents    Health Care Decision Makers:       Primary Decision Maker: Pierce Galicia - Spouse - [phone]    Secondary Decision Maker: JayDwain - Parent - [phone]  Summary:  Completed New Documents  Updated Healthcare Decision Maker    Advance Care Planning Documents (Patient Wishes):  Healthcare Power of /Advance Directive Appointment of Health Care Agent  Living Will/Advance Directive  Anatomical Gift/Organ Donation     Assessment:   Patient completed an Advanced Medical Directive; and Goals of Care discussion held with , Patient, and Spouse. Original AMD returend to patient long with 3 copies; paper copy left on the chart for scanning. Computer ACP Screen updated.     Interventions:  Provided education on documents for clarity and greater understanding  Discussed and provided education on state decision maker hierarchy  Assisted in the completion of documents according to patient's wishes at this time  Encouraged ongoing ACP conversation with future decision makers and loved ones    Care Preferences Communicated:   No    Outcomes/Plan:  New advance directive completed.  Returned original document(s) to patient, as well as copies for distribution to appointed agents  Copy of advance directive given to staff to scan into medical record.    Electronically signed by PRINCESS Moran on 11/18/2024 at 4:02 PM

## 2024-11-18 NOTE — PROGRESS NOTES
1245~  Patient arrived from home for IOL for gestational hypertension and IDGDM at 38.6.      1315~  Delivery and anesthesia consents reviewed and signed by patient.    1330~  Bedside report given to CRISTELA Saldana RN

## 2024-11-18 NOTE — PROGRESS NOTES
1715-In room to give scheduled cytotec dose, pt in pain, states constant lower abdominal cramping, toco adjusted. Cytotec held until can assess contraction pattern. Requesting IV pain medication.

## 2024-11-18 NOTE — PROGRESS NOTES
1300 patient arrives for induction of labor. Patient reports positive fetal movement, denies any vaginal bleeding or leaking of fluid. Denies any headache, visual changes, epigastric pain, N/V.   1608 Dr. Rawls at bedside  1620 cervical balloon placed by Dr. Rawls, 60/60 SVE 1/20/-3  1726 first dose of cytotec given see MAR

## 2024-11-18 NOTE — PROGRESS NOTES
In to introduce myself to pt.   She is s/p Nubain and was sound asleep.  No one else was in the room with her at this time.  FHT baseline 120, min to mod variability, no decels, nubain effect seen  TOCO q 8-10 minutes  Will come back later.

## 2024-11-19 LAB
GLUCOSE BLD STRIP.AUTO-MCNC: 108 MG/DL (ref 65–117)
GLUCOSE BLD STRIP.AUTO-MCNC: 84 MG/DL (ref 65–117)
GLUCOSE BLD STRIP.AUTO-MCNC: 97 MG/DL (ref 65–117)
SERVICE CMNT-IMP: NORMAL

## 2024-11-19 PROCEDURE — 6360000002 HC RX W HCPCS: Performed by: OBSTETRICS & GYNECOLOGY

## 2024-11-19 PROCEDURE — 6370000000 HC RX 637 (ALT 250 FOR IP): Performed by: OBSTETRICS & GYNECOLOGY

## 2024-11-19 PROCEDURE — 3700000025 EPIDURAL BLOCK: Performed by: ANESTHESIOLOGY

## 2024-11-19 PROCEDURE — 82962 GLUCOSE BLOOD TEST: CPT

## 2024-11-19 PROCEDURE — 10907ZC DRAINAGE OF AMNIOTIC FLUID, THERAPEUTIC FROM PRODUCTS OF CONCEPTION, VIA NATURAL OR ARTIFICIAL OPENING: ICD-10-PCS | Performed by: OBSTETRICS & GYNECOLOGY

## 2024-11-19 PROCEDURE — 7210000100 HC LABOR FEE PER 1 HR

## 2024-11-19 PROCEDURE — 1120000000 HC RM PRIVATE OB

## 2024-11-19 PROCEDURE — 2500000003 HC RX 250 WO HCPCS: Performed by: STUDENT IN AN ORGANIZED HEALTH CARE EDUCATION/TRAINING PROGRAM

## 2024-11-19 PROCEDURE — 51701 INSERT BLADDER CATHETER: CPT

## 2024-11-19 PROCEDURE — 6360000002 HC RX W HCPCS: Performed by: STUDENT IN AN ORGANIZED HEALTH CARE EDUCATION/TRAINING PROGRAM

## 2024-11-19 PROCEDURE — 7100000001 HC PACU RECOVERY - ADDTL 15 MIN

## 2024-11-19 PROCEDURE — 7100000000 HC PACU RECOVERY - FIRST 15 MIN

## 2024-11-19 PROCEDURE — 7220000101 HC DELIVERY VAGINAL/SINGLE

## 2024-11-19 PROCEDURE — 2580000003 HC RX 258: Performed by: OBSTETRICS & GYNECOLOGY

## 2024-11-19 PROCEDURE — 0UQJXZZ REPAIR CLITORIS, EXTERNAL APPROACH: ICD-10-PCS | Performed by: OBSTETRICS & GYNECOLOGY

## 2024-11-19 PROCEDURE — 0KQM0ZZ REPAIR PERINEUM MUSCLE, OPEN APPROACH: ICD-10-PCS | Performed by: OBSTETRICS & GYNECOLOGY

## 2024-11-19 RX ORDER — ONDANSETRON 4 MG/1
4 TABLET, ORALLY DISINTEGRATING ORAL EVERY 6 HOURS PRN
Status: DISCONTINUED | OUTPATIENT
Start: 2024-11-19 | End: 2024-11-19 | Stop reason: SDUPTHER

## 2024-11-19 RX ORDER — MODIFIED LANOLIN
OINTMENT (GRAM) TOPICAL PRN
Status: DISCONTINUED | OUTPATIENT
Start: 2024-11-19 | End: 2024-11-21 | Stop reason: HOSPADM

## 2024-11-19 RX ORDER — METHYLERGONOVINE MALEATE 0.2 MG/ML
200 INJECTION INTRAVENOUS PRN
Status: DISCONTINUED | OUTPATIENT
Start: 2024-11-19 | End: 2024-11-21 | Stop reason: HOSPADM

## 2024-11-19 RX ORDER — SODIUM CHLORIDE, SODIUM LACTATE, POTASSIUM CHLORIDE, CALCIUM CHLORIDE 600; 310; 30; 20 MG/100ML; MG/100ML; MG/100ML; MG/100ML
INJECTION, SOLUTION INTRAVENOUS CONTINUOUS
Status: DISCONTINUED | OUTPATIENT
Start: 2024-11-19 | End: 2024-11-21 | Stop reason: HOSPADM

## 2024-11-19 RX ORDER — ONDANSETRON 2 MG/ML
4 INJECTION INTRAMUSCULAR; INTRAVENOUS EVERY 6 HOURS PRN
Status: DISCONTINUED | OUTPATIENT
Start: 2024-11-19 | End: 2024-11-19 | Stop reason: SDUPTHER

## 2024-11-19 RX ORDER — OXYCODONE HYDROCHLORIDE 5 MG/1
5 TABLET ORAL EVERY 4 HOURS PRN
Status: DISCONTINUED | OUTPATIENT
Start: 2024-11-19 | End: 2024-11-21 | Stop reason: HOSPADM

## 2024-11-19 RX ORDER — ACETAMINOPHEN 500 MG
1000 TABLET ORAL EVERY 8 HOURS SCHEDULED
Status: DISCONTINUED | OUTPATIENT
Start: 2024-11-19 | End: 2024-11-21 | Stop reason: HOSPADM

## 2024-11-19 RX ORDER — FENTANYL CITRATE 50 UG/ML
INJECTION, SOLUTION INTRAMUSCULAR; INTRAVENOUS
Status: DISCONTINUED | OUTPATIENT
Start: 2024-11-19 | End: 2024-11-19 | Stop reason: SDUPTHER

## 2024-11-19 RX ORDER — CARBOPROST TROMETHAMINE 250 UG/ML
250 INJECTION, SOLUTION INTRAMUSCULAR PRN
Status: DISCONTINUED | OUTPATIENT
Start: 2024-11-19 | End: 2024-11-21 | Stop reason: HOSPADM

## 2024-11-19 RX ORDER — MISOPROSTOL 200 UG/1
400 TABLET ORAL PRN
Status: DISCONTINUED | OUTPATIENT
Start: 2024-11-19 | End: 2024-11-21 | Stop reason: HOSPADM

## 2024-11-19 RX ORDER — BUPIVACAINE HYDROCHLORIDE 2.5 MG/ML
INJECTION, SOLUTION EPIDURAL; INFILTRATION; INTRACAUDAL
Status: DISCONTINUED | OUTPATIENT
Start: 2024-11-19 | End: 2024-11-19 | Stop reason: SDUPTHER

## 2024-11-19 RX ORDER — ONDANSETRON 2 MG/ML
4 INJECTION INTRAMUSCULAR; INTRAVENOUS EVERY 6 HOURS PRN
Status: DISCONTINUED | OUTPATIENT
Start: 2024-11-19 | End: 2024-11-21 | Stop reason: HOSPADM

## 2024-11-19 RX ORDER — BUPIVACAINE HYDROCHLORIDE 2.5 MG/ML
INJECTION, SOLUTION EPIDURAL; INFILTRATION; INTRACAUDAL
Status: COMPLETED
Start: 2024-11-19 | End: 2024-11-19

## 2024-11-19 RX ORDER — FENTANYL CITRATE 50 UG/ML
INJECTION, SOLUTION INTRAMUSCULAR; INTRAVENOUS
Status: DISCONTINUED
Start: 2024-11-19 | End: 2024-11-19 | Stop reason: SDUPTHER

## 2024-11-19 RX ORDER — SODIUM CHLORIDE 9 MG/ML
INJECTION, SOLUTION INTRAVENOUS PRN
Status: DISCONTINUED | OUTPATIENT
Start: 2024-11-19 | End: 2024-11-21 | Stop reason: HOSPADM

## 2024-11-19 RX ORDER — IBUPROFEN 400 MG/1
800 TABLET, FILM COATED ORAL EVERY 8 HOURS SCHEDULED
Status: DISCONTINUED | OUTPATIENT
Start: 2024-11-19 | End: 2024-11-21 | Stop reason: HOSPADM

## 2024-11-19 RX ORDER — ONDANSETRON 4 MG/1
4 TABLET, ORALLY DISINTEGRATING ORAL EVERY 6 HOURS PRN
Status: DISCONTINUED | OUTPATIENT
Start: 2024-11-19 | End: 2024-11-21 | Stop reason: HOSPADM

## 2024-11-19 RX ORDER — LIDOCAINE HCL/EPINEPHRINE/PF 2%-1:200K
VIAL (ML) INJECTION
Status: COMPLETED
Start: 2024-11-19 | End: 2024-11-19

## 2024-11-19 RX ORDER — DOCUSATE SODIUM 100 MG/1
100 CAPSULE, LIQUID FILLED ORAL 2 TIMES DAILY PRN
Status: DISCONTINUED | OUTPATIENT
Start: 2024-11-19 | End: 2024-11-21 | Stop reason: HOSPADM

## 2024-11-19 RX ORDER — SODIUM CHLORIDE 9 MG/ML
INJECTION, SOLUTION INTRAVENOUS CONTINUOUS
Status: DISCONTINUED | OUTPATIENT
Start: 2024-11-19 | End: 2024-11-21 | Stop reason: HOSPADM

## 2024-11-19 RX ORDER — SODIUM CHLORIDE 0.9 % (FLUSH) 0.9 %
5-40 SYRINGE (ML) INJECTION EVERY 12 HOURS SCHEDULED
Status: DISCONTINUED | OUTPATIENT
Start: 2024-11-19 | End: 2024-11-21 | Stop reason: HOSPADM

## 2024-11-19 RX ORDER — MISOPROSTOL 200 UG/1
800 TABLET ORAL PRN
Status: DISCONTINUED | OUTPATIENT
Start: 2024-11-19 | End: 2024-11-21 | Stop reason: HOSPADM

## 2024-11-19 RX ORDER — SODIUM CHLORIDE 0.9 % (FLUSH) 0.9 %
5-40 SYRINGE (ML) INJECTION PRN
Status: DISCONTINUED | OUTPATIENT
Start: 2024-11-19 | End: 2024-11-21 | Stop reason: HOSPADM

## 2024-11-19 RX ORDER — DOCUSATE SODIUM 100 MG/1
100 CAPSULE, LIQUID FILLED ORAL 2 TIMES DAILY
Status: DISCONTINUED | OUTPATIENT
Start: 2024-11-19 | End: 2024-11-21 | Stop reason: HOSPADM

## 2024-11-19 RX ORDER — LIDOCAINE HCL/EPINEPHRINE/PF 2%-1:200K
VIAL (ML) INJECTION
Status: DISCONTINUED | OUTPATIENT
Start: 2024-11-19 | End: 2024-11-19 | Stop reason: SDUPTHER

## 2024-11-19 RX ADMIN — SODIUM CHLORIDE: 9 INJECTION, SOLUTION INTRAVENOUS at 01:50

## 2024-11-19 RX ADMIN — OXYTOCIN 1 MILLI-UNITS/MIN: 10 INJECTION, SOLUTION INTRAMUSCULAR; INTRAVENOUS at 03:59

## 2024-11-19 RX ADMIN — FENTANYL CITRATE 50 MCG: 50 INJECTION, SOLUTION INTRAMUSCULAR; INTRAVENOUS at 01:22

## 2024-11-19 RX ADMIN — Medication 10 ML/HR: at 01:43

## 2024-11-19 RX ADMIN — ACETAMINOPHEN 1000 MG: 500 TABLET ORAL at 15:01

## 2024-11-19 RX ADMIN — FENTANYL CITRATE 50 MCG: 50 INJECTION, SOLUTION INTRAMUSCULAR; INTRAVENOUS at 01:28

## 2024-11-19 RX ADMIN — Medication 10 ML/HR: at 08:04

## 2024-11-19 RX ADMIN — ACETAMINOPHEN 1000 MG: 500 TABLET ORAL at 22:54

## 2024-11-19 RX ADMIN — NALBUPHINE HYDROCHLORIDE 5 MG: 10 INJECTION, SOLUTION INTRAMUSCULAR; INTRAVENOUS; SUBCUTANEOUS at 00:32

## 2024-11-19 RX ADMIN — DOCUSATE SODIUM 100 MG: 100 CAPSULE, LIQUID FILLED ORAL at 22:54

## 2024-11-19 RX ADMIN — LIDOCAINE HYDROCHLORIDE AND EPINEPHRINE 3 ML: 20; 5 INJECTION, SOLUTION EPIDURAL; INFILTRATION; INTRACAUDAL; PERINEURAL at 01:22

## 2024-11-19 RX ADMIN — IBUPROFEN 800 MG: 400 TABLET, FILM COATED ORAL at 15:01

## 2024-11-19 RX ADMIN — BUPIVACAINE HYDROCHLORIDE 3 ML: 2.5 INJECTION, SOLUTION EPIDURAL; INFILTRATION; INTRACAUDAL; PERINEURAL at 01:28

## 2024-11-19 RX ADMIN — BUPIVACAINE HYDROCHLORIDE 5 ML: 2.5 INJECTION, SOLUTION EPIDURAL; INFILTRATION; INTRACAUDAL; PERINEURAL at 08:48

## 2024-11-19 RX ADMIN — IBUPROFEN 800 MG: 400 TABLET, FILM COATED ORAL at 22:54

## 2024-11-19 ASSESSMENT — PAIN SCALES - GENERAL
PAINLEVEL_OUTOF10: 4
PAINLEVEL_OUTOF10: 6

## 2024-11-19 ASSESSMENT — PAIN - FUNCTIONAL ASSESSMENT: PAIN_FUNCTIONAL_ASSESSMENT: ACTIVITIES ARE NOT PREVENTED

## 2024-11-19 ASSESSMENT — PAIN DESCRIPTION - LOCATION
LOCATION: ABDOMEN
LOCATION: ABDOMEN

## 2024-11-19 ASSESSMENT — PAIN DESCRIPTION - DESCRIPTORS: DESCRIPTORS: CRAMPING

## 2024-11-19 ASSESSMENT — PAIN DESCRIPTION - ORIENTATION: ORIENTATION: LOWER

## 2024-11-19 NOTE — ANESTHESIA PROCEDURE NOTES
Epidural Block    Patient location during procedure: OB  Start time: 11/19/2024 1:11 AM  End time: 11/19/2024 1:20 AM  Reason for block: labor epidural  Staffing  Performed: anesthesiologist   Anesthesiologist: Nicholas Fonseca DO  Performed by: Nicholas Fonseca DO  Authorized by: Nicholas Fonseca DO    Epidural  Patient position: sitting  Prep: DuraPrep  Patient monitoring: cardiac monitor, continuous pulse ox and frequent blood pressure checks  Approach: midline  Location: L3-4  Injection technique: ROSELIA saline  Provider prep: mask and sterile gloves  Needle  Needle type: Tuohy   Needle gauge: 17 G  Needle length: 3.5 in  Needle insertion depth: 6 cm  Catheter type: multi-orifice  Catheter size: 19 G  Catheter at skin depth: 10 cm  Test dose: negativeCatheter Secured: tegaderm and tape  Assessment  Sensory level: T10  Hemodynamics: stable  Attempts: 1  Outcomes: uncomplicated and patient tolerated procedure well  Additional Notes  No passive fluid flow back noted on the catheter. No blood or CSF noted upon aspiration of the catheter.  Preanesthetic Checklist  Completed: patient identified, IV checked, site marked, risks and benefits discussed, surgical/procedural consents, equipment checked, pre-op evaluation, timeout performed, anesthesia consent given, oxygen available, monitors applied/VS acknowledged and fire risk safety assessment completed and verbalized

## 2024-11-19 NOTE — PROGRESS NOTES
In to meet pt and .  She is having a hard time coping with the cramps and contractions.  Feels like she is reaching her \"desperation point\".  She worries she won't be able to get any sleep.  She says the nubain earlier helped with the pain but also made her \"trippy\" and she really didn't like that feeling.  FHT baseline 120, mod variability, accels present, decels absent  TOCO q 5-6 minutes  Cx deferred  BS 93, pt not eating very much    --Will give half dose of her night time insulin  --Decrease balloons by 10 cc each  --Will try to get pain under reasonable control before next dose of cytotec is given  --Discussed possibly trying dilaudid

## 2024-11-19 NOTE — PROGRESS NOTES
Labor Progress Note  Patient seen, fetal heart rate and contraction pattern evaluated, patient examined. Pt has hot spot with epidural- front right.     Patient Vitals for the past 2 hrs:   BP Temp Temp src Pulse Resp SpO2   11/19/24 0658 -- 98.1 °F (36.7 °C) Oral -- 14 94 %   11/19/24 0652 125/74 -- -- 80 -- --   11/19/24 0623 137/75 -- -- 77 -- --    BG 84    Physical Exam:  Cervical Exam:  5 cm dilated    80% effaced    0 station  Presenting Part: cephalic  Uterine Activity: Frequency: Every 2-3 minutes on 8 pit  Fetal Heart Rate: Reactive  Baseline: 105-110s per minute  Variability: moderate  Accelerations: yes  Decelerations: none  Bag of water sitting outside of vagina on bed but still appears intact  AROM performed.   Small amount of bloody show    Assessment/Plan:  IOL for GHTN and GDMA2 s/p cook/cytotec and now on pitocin. Now s/p AROM Category I tracing. Labor progressing. Continue pitocin induction of labor. BS normal.     Aneesh Quintanilla, DO

## 2024-11-19 NOTE — PROGRESS NOTES
194: Bedside and Verbal shift change report given to TREVOR Babin (oncoming nurse) by CRISTELA Saldana (offgoing nurse). Report included the following information Nurse Handoff Report, Intake/Output, MAR, and Recent Results.      : provided heat pack for lower back contraction pain. Patient requesting unisom for sleep    2100: discussed plan of care w/ Dr Alvarez. Patient struggling to cope with ctx, will remove 10 mL from vaginal and uterine balloon if balloon is not ready to come out    : cook balloon firmly in place, 10mL removed from each balloon. Blood sugar was 93, per Dr Alvarez decrease NPH from 30u to 15u    0050: patient requesting epidural, fluid bolus initiated    0112: Dr Fonseca at bedside, time out complete. Verified name, , procedure, and allergies    0122: patient laying in L lateral    0135: turned to R lateral, peanut ball between knees    0250: turned L lateral, peanut ball between knees. Straight cathed at 0245, 475mL urine output    0415: Turned to R lateral, peanut ball between knees    0525: turned to L lateral exaggerated runners w/ peanut ball under R leg, straight cathed 300mL urine. SVE-4-5/80/-3    0630: patient reporting mild right lower abdominal pain. Turned to R lateral, educated patient on use of PCA button    0730: Bedside and Verbal shift change report given to LIBERTAD Umanzor RN (oncoming nurse) by TREVOR Babin RN (offgoing nurse). Report included the following information Nurse Handoff Report, Intake/Output, MAR, and Recent Results.

## 2024-11-19 NOTE — L&D DELIVERY NOTE
Beny Galicia [[id number]]      Labor Events     Labor: No   Steroids: None  Cervical Ripening Date/Time:        Rupture Date/Time:  24 07:55:00   Rupture Type: AROM, Intact  Fluid Color: Clear  Fluid Odor: None  Induction: Rojas Bulb (Balloon), Misoprostol  Augmentation: AROM, Oxytocin  Labor Complications: None       Anesthesia    Method: Epidural       Labor Event Times      Labor onset date/time:        Dilation complete date/time:  24 11:45:00     Start pushing date/time:  2024 11:23:00   Decision date/time (emergent ):            Delivery Details      Delivery Date: 24 Delivery Time: 12:41:00   Delivery Type: Vaginal, Spontaneous              Timber Presentation    Presentation: Vertex  Position: Middle  _: Occiput       Shoulder Dystocia    Shoulder Dystocia Present?: No       Assisted Delivery Details    Forceps Attempted?: No  Vacuum Extractor Attempted?: No                           Cord    Vessels: 3 Vessels  Complications: Nuchal Loose  Cord Around: Head  Delayed Cord Clamping?: Yes  Cord Clamped Date/Time: 2024 12:44:00  Cord Blood Disposition: Lab  Gases Sent?: No              Placenta    Date/Time: 2024 12:49:00  Removal: Spontaneous  Appearance: Intact  Disposition: Discarded       Lacerations    Episiotomy: None  Perineal Lacerations: 1st  Number of Repair Packets: 1       Vaginal Counts    Initial Count Personnel: LISSY CLAY  Initial Count Verified By: APOLONIA VICK  Intial Sponge Count: Correct Intial Needles Count: Correct Intial Instruments Count: Correct   Final Sponges Count: Correct Final Needles  Count: Correct Final Instruments Count: Correct   Final Count Personnel: DR. CANAS  Final Count Verified By: APOLONIA VICK  Accurate Final Count?: Yes       Blood Loss  Mother: Ana Cristina Galicia #[id number]     Start of Mother's Information      Delivery Blood Loss   Intrapartum & Postpartum: 24 0041 - 24 1316    Delivery

## 2024-11-19 NOTE — PROGRESS NOTES
0730: Bedside and Verbal shift change report given to APOLONIA Umanzor RN (oncoming nurse) by RAYSHAWN Babin RN (offgoing nurse). Report included the following information Nurse Handoff Report, Adult Overview, Intake/Output, MAR, Recent Results, and Event Log.    0750: Dr. Quintanilla at the bedside to assess pt progress. SVE 5/80/0. Bag of water sitting outside of vagina but still intact. AROM.     0810: Pt turned to right side.    0855: Anesthesia at the bedside to assess epidural. Re dose given (see MAR).     0943: Turned pt to left side w/ peanut ball.    1037: POC 97. Pt turned to right side w/ peanut ball in between ankles.  at the bedside.     1145: Pt called RN to bedside, feeling constant pressure. SVE C/C/+2. Dr. Quintanilla notified. Will start pushing when Dr. Quintanilla is at the bedside.    1223: Pt started pushing.    1232: Dr. Sung called to bedside for delivery.    1241:  of live baby boy!    1258: Straigth cath, 300cc.     1355: Bladder noticeably full during fundal assessment. Straight cath, 250cc.

## 2024-11-19 NOTE — PROGRESS NOTES
2256: Pt called out while in BR,states that she is having some bleeding, states cervical ripening balloon still in place. This RN noted a small amount of vaginal bleeding with wiping, applied gentle pressure to balloon and it was removed easily. Shalini pads given with instructions to inform RN if having clots or saturating pads.

## 2024-11-19 NOTE — ANESTHESIA PRE PROCEDURE
Department of Anesthesiology  Preprocedure Note       Name:  Ana Cristina Galicia   Age:  34 y.o.  :  1990                                          MRN:  [id number]         Date:  2024      Surgeon: * No surgeons listed *    Procedure: * No procedures listed *    Medications prior to admission:   Prior to Admission medications    Medication Sig Start Date End Date Taking? Authorizing Provider   insulin NPH (HUMULIN N;NOVOLIN N) 100 UNIT/ML injection vial Inject 30 Units into the skin nightly   Yes Odin Gerard MD   choline fenofibrate (TRILIPIX) 45 MG CPDR delayed release capsule Take 1 capsule by mouth daily   Yes Odin Gerard MD   Doxylamine Succinate, Sleep, (UNISOM PO) Take by mouth   Yes Odin Gerard MD   Prenatal Vit w/Pp-Dukiirxas-LS (PNV PO) Take by mouth   Yes Odin Gerard MD   sertraline (ZOLOFT) 100 MG tablet Take 1 tablet by mouth daily   Yes Automatic Reconciliation, Ar   Pyridoxine HCl (B-6 PO) Take by mouth  Patient not taking: Reported on 2024    Odin Gerard MD   Docosahexaenoic Acid (DHA PO) Take by mouth  Patient not taking: Reported on 2024    Odin Gerard MD   insulin NPH (HUMULIN N KWIKPEN) 100 UNIT/ML injection pen Inject 8 Units into the skin nightly  Patient taking differently: Inject 30 Units into the skin nightly 6/10/24 7/10/24  Doni Castro MD   cyanocobalamin 1000 MCG/ML injection Inject 1 mL into the muscle once  Patient not taking: Reported on 6/10/2024    Odin Gerard MD   aspirin 81 MG EC tablet Take 1 tablet by mouth daily  Patient not taking: Reported on 2024    Odin Gerard MD   vitamin D 25 MCG (1000 UT) CAPS Take by mouth daily  Patient not taking: Reported on 2024    Automatic Reconciliation, Ar   fexofenadine (ALLEGRA) 180 MG tablet Take by mouth daily as needed  Patient not taking: Reported on 2024    Automatic Reconciliation, Ar   SUMAtriptan (IMITREX) 50 MG

## 2024-11-20 PROCEDURE — 6370000000 HC RX 637 (ALT 250 FOR IP): Performed by: OBSTETRICS & GYNECOLOGY

## 2024-11-20 PROCEDURE — 1120000000 HC RM PRIVATE OB

## 2024-11-20 RX ADMIN — ACETAMINOPHEN 1000 MG: 500 TABLET ORAL at 20:15

## 2024-11-20 RX ADMIN — IBUPROFEN 800 MG: 400 TABLET, FILM COATED ORAL at 07:23

## 2024-11-20 RX ADMIN — DOCUSATE SODIUM 100 MG: 100 CAPSULE, LIQUID FILLED ORAL at 20:15

## 2024-11-20 RX ADMIN — ACETAMINOPHEN 1000 MG: 500 TABLET ORAL at 07:23

## 2024-11-20 RX ADMIN — IBUPROFEN 800 MG: 400 TABLET, FILM COATED ORAL at 15:41

## 2024-11-20 ASSESSMENT — PAIN DESCRIPTION - DESCRIPTORS: DESCRIPTORS: ACHING;BURNING

## 2024-11-20 ASSESSMENT — PAIN DESCRIPTION - LOCATION
LOCATION: ABDOMEN;BREAST;PERINEUM
LOCATION: PERINEUM;BREAST

## 2024-11-20 ASSESSMENT — PAIN SCALES - GENERAL
PAINLEVEL_OUTOF10: 3
PAINLEVEL_OUTOF10: 4

## 2024-11-20 ASSESSMENT — PAIN DESCRIPTION - ORIENTATION: ORIENTATION: LOWER;RIGHT;LEFT

## 2024-11-20 ASSESSMENT — PAIN - FUNCTIONAL ASSESSMENT
PAIN_FUNCTIONAL_ASSESSMENT: ACTIVITIES ARE NOT PREVENTED
PAIN_FUNCTIONAL_ASSESSMENT: ACTIVITIES ARE NOT PREVENTED

## 2024-11-20 NOTE — ANESTHESIA POSTPROCEDURE EVALUATION
Department of Anesthesiology  Postprocedure Note    Patient: Ana Cristina Galicia  MRN: [id number]  YOB: 1990  Date of evaluation: 11/19/2024    Procedure Summary       Date: 11/19/24 Room / Location:     Anesthesia Start: 0111 Anesthesia Stop: 1241    Procedure: Labor Analgesia Diagnosis:     Scheduled Providers:  Responsible Provider: Domingo Champion MD    Anesthesia Type: epidural ASA Status: 2            Anesthesia Type: No value filed.    Leandro Phase I:      Leandro Phase II:      Anesthesia Post Evaluation    Patient location during evaluation: bedside  Patient participation: complete - patient participated  Level of consciousness: awake  Pain score: 0  Airway patency: patent  Nausea & Vomiting: no nausea and no vomiting  Cardiovascular status: blood pressure returned to baseline  Respiratory status: acceptable  Hydration status: euvolemic  Comments: /85   Pulse 97   Temp 99.5 °F (37.5 °C) (Oral)   Resp 16   Ht 1.702 m (5' 7\")   Wt 85.9 kg (189 lb 6.4 oz)   SpO2 97%   Breastfeeding Unknown   BMI 29.66 kg/m²     Pain management: adequate    No notable events documented.

## 2024-11-20 NOTE — LACTATION NOTE
This note was copied from a baby's chart.  . Mother states that baby \"chomped\" down on right nipple. Right nipple is red and bruised. Fabian Goel's ordered. Baby's blood sugar was 42. RN gave glucose gel. Mother would like to supplement baby with donor milk while her nipples heal. Brought mother a hospital grade pump and did the first pumping session with her. Mother pumped out 5 ml of colostrum. Syringe fed colostrum to baby. RN brought 12 ml of donor milk. Showed father paced feeding with an extra slow flow nipple. Plan of care is to latch baby when mother is feeling better after using the Fabian Goel's. Mother will supplement baby with donor milk and pump for 15 minutes anytime baby is getting a supplement.

## 2024-11-20 NOTE — LACTATION NOTE
This note was copied from a baby's chart.  Mom states she has not been able to breast feed because baby was biting down on her nipple causing damage. Baby was sucking on my finger and I did not feel  him biting or chomping. I helped mom get baby latched in the cross cradle hold. Baby was able to get a deep latch. He was sucking rhythmically with audible swallows. We were able to get baby latched on both breasts.     She will continue to feed according baby's feeding cues or at least every 3 hours. She will not limit the time is at the breast and will offer both breasts at each feeding.

## 2024-11-20 NOTE — PROGRESS NOTES
Post-Partum Day Number 1 Progress Note    Patient doing well post-partum without significant complaints.  Voiding without difficulty, normal lochia. Breastfeeding and working with lactation.     Denies HA, visual changes, SOB, mid epigastric pain, or RUQ pain.       Vitals:  Patient Vitals for the past 24 hrs:   BP Temp Temp src Pulse Resp SpO2   24 0030 107/68 97.5 °F (36.4 °C) Oral 83 16 96 %   24 2045 123/78 98.6 °F (37 °C) Oral 100 16 97 %   24 1540 128/85 99.5 °F (37.5 °C) Oral 97 16 97 %   24 1440 124/65 -- -- 90 -- --   24 1425 125/67 -- -- 95 -- --   24 1410 125/69 -- -- 95 -- --   24 1355 139/73 -- -- 88 -- --   24 1340 134/74 -- -- 83 -- --   24 1325 121/72 -- -- 88 -- --   24 1310 126/89 -- -- 93 -- --   24 1255 126/79 -- -- 93 -- --     Temp (24hrs), Av.5 °F (36.9 °C), Min:97.5 °F (36.4 °C), Max:99.5 °F (37.5 °C)      Vital signs stable, afebrile.    Exam:  Patient without distress.    Lungs: CTA bilaterally     Card: RRR               Abdomen soft, fundus firm, nontender               Lower extremities- nontender without edema; no cords    Labs: No results found for this or any previous visit (from the past 24 hour(s)).    Assessment and Plan:    Ms. Ana Cristina Galicia is a 34 y.o.  s/p  of a VMI at 39wks following IOL 2/2 GHTN.     GHTN  - BP normotensive, asymptomatic  - no need for meds  - Plan for 1 wk BP check in office     GDMA2  - discontinue insulin   - Plan for 2 hour GTT at 8-12wks post partum     Post partum Care  - Rh+/ Rubella immune  - Boy, desires circ. Done today  - Plan for discharge tomorrow per pt request    Sirena Rawls,   12:35 PM

## 2024-11-20 NOTE — DISCHARGE INSTRUCTIONS
the stitches or hemorrhoids.  You can use either a sitz bath basin or a bathtub filled with 2-3\" inches of plain warm water.  Soak for 10 minutes 3 times a day.    Pain Management  You can take acetominophen and ibuprofen together. You will get the most relief if you take the maximum dose:  Tylenol or acetominophen 1000 mg every 8 hours (equivalent to 2 Extra Strength Tylenols every 8 hours) (can do every 6hrs if needed with max of 4000mg in 24 hours)  Motrin or Advil (generic ibuprofen) 800 mg every 8 hours (4 tablets or capsules every 8 hours)  If you were given a prescription pain medication, you can take ibuprofen along with it (doses as above), but not Tylenol.  Use ibuprofen as the main medication, and take the prescription medication if needed for more severe pain not relieved by the ibuprofen.  Your goal should be to take only the minimum necessary amounts of the prescription medication (narcotic), as these pain medicines can be habit-forming and will worsen or cause constipation.  Most patients will find that within a couple of days, their pain is adequately controlled using only over-the-counter medications.  A heating pad can also be very helpful for cramping or back pain.  Over-the-counter hemorrhoid wipes and ointments are fine to use if you have hemorrhoids.    Constipation  You may find that bowel movements are irregular after delivery and that you have a tendency to be constipated.  If you have stitches (and especially if you had a more severe tear called a third- or fourth-degree), your bowel movements will be more comfortable if they are soft.  A stool softener such as Colace (docusate) can safely be taken daily if needed.  If you become constipated you can use a laxative such as Dulcolax, Miralax or Milk of Magnesia.  Don't wait until constipation is severe- take something sooner rather than later and you will feel much better!         Your Recovery: What to Expect at Home  Delivering a baby is

## 2024-11-20 NOTE — PROGRESS NOTES
Spiritual Health History and Assessment/Progress Note  [facility]    Follow-up,  ,  ,      Name: Ana Cristina Galicia MRN: [id number]    Age: 34 y.o.     Sex: female   Language: English   Muslim: Scientologist   Gestational diabetes     Date: 11/20/2024            Total Time Calculated: 20 min              Spiritual Assessment continued in [facility] 3W MOTHER INFANT        Referral/Consult From: Patient   Encounter Overview/Reason: Follow-up  Service Provided For: Patient and family together    Elly, Belief, Meaning:   Patient is connected with a elly tradition or spiritual practice and has beliefs or practices that help with coping during difficult times  Family/Friends are connected with a elly tradition or spiritual practice and have beliefs or practices that help with coping during difficult times      Importance and Influence:  Patient has spiritual/personal beliefs that influence decisions regarding their health  Family/Friends have spiritual/personal beliefs that influence decisions regarding the patient's health    Community:  Patient is connected with a spiritual community and feels well-supported. Support system includes: Spouse/Partner, Parent/s, Friends, and Extended family  Family/Friends are connected with a spiritual community: and feel well-supported. Support system includes: Spouse/Partner, Parent/s, Friends, and Extended family    Assessment and Plan of Care:     Patient Interventions include: Facilitated expression of thoughts and feelings and Affirmed coping skills/support systems, offered well wishes and words of [place] for baby and family   Family/Friends Interventions include: Facilitated expression of thoughts and feelings and Affirmed coping skills/support systems    Patient Plan of Care: Spiritual Care available upon further referral  Family/Friends Plan of Care: Spiritual Care available upon further referral    Electronically signed by PRINCESS Moran on 11/20/2024 at 4:44 PM

## 2024-11-21 VITALS
OXYGEN SATURATION: 95 % | HEART RATE: 90 BPM | SYSTOLIC BLOOD PRESSURE: 124 MMHG | HEIGHT: 67 IN | RESPIRATION RATE: 14 BRPM | BODY MASS INDEX: 29.73 KG/M2 | WEIGHT: 189.4 LBS | TEMPERATURE: 98.1 F | DIASTOLIC BLOOD PRESSURE: 83 MMHG

## 2024-11-21 PROBLEM — O13.9 GESTATIONAL HYPERTENSION: Status: ACTIVE | Noted: 2024-11-21

## 2024-11-21 PROCEDURE — 6370000000 HC RX 637 (ALT 250 FOR IP): Performed by: OBSTETRICS & GYNECOLOGY

## 2024-11-21 RX ORDER — IBUPROFEN 800 MG/1
800 TABLET, FILM COATED ORAL EVERY 8 HOURS SCHEDULED
Qty: 120 TABLET | Refills: 3 | Status: SHIPPED | OUTPATIENT
Start: 2024-11-21

## 2024-11-21 RX ORDER — ACETAMINOPHEN 500 MG
1000 TABLET ORAL EVERY 8 HOURS SCHEDULED
Qty: 120 TABLET | Refills: 3 | Status: SHIPPED | OUTPATIENT
Start: 2024-11-21

## 2024-11-21 RX ADMIN — DOCUSATE SODIUM 100 MG: 100 CAPSULE, LIQUID FILLED ORAL at 11:15

## 2024-11-21 RX ADMIN — IBUPROFEN 800 MG: 400 TABLET, FILM COATED ORAL at 11:07

## 2024-11-21 RX ADMIN — ACETAMINOPHEN 1000 MG: 500 TABLET ORAL at 12:38

## 2024-11-21 RX ADMIN — IBUPROFEN 800 MG: 400 TABLET, FILM COATED ORAL at 01:03

## 2024-11-21 ASSESSMENT — PAIN SCALES - GENERAL
PAINLEVEL_OUTOF10: 2
PAINLEVEL_OUTOF10: 3
PAINLEVEL_OUTOF10: 3

## 2024-11-21 ASSESSMENT — PAIN DESCRIPTION - DESCRIPTORS
DESCRIPTORS: BURNING;OTHER (COMMENT)
DESCRIPTORS: CRAMPING

## 2024-11-21 ASSESSMENT — PAIN DESCRIPTION - ORIENTATION
ORIENTATION: RIGHT;LEFT;LOWER
ORIENTATION: RIGHT;LEFT;LOWER
ORIENTATION: LOWER

## 2024-11-21 ASSESSMENT — PAIN DESCRIPTION - LOCATION
LOCATION: PERINEUM;BREAST
LOCATION: ABDOMEN
LOCATION: BREAST;PERINEUM

## 2024-11-21 NOTE — LACTATION NOTE
This note was copied from a baby's chart.  Baby is nursing well and improved throughout hospital stay.  Deep latch maintained, mother is more comfortable, using Fabian Goel's Cream for nipple damage from early shallow latches, baby feeding vigorously with rhythmic suck, swallow, breathe pattern, audible swallowing, and evident milk transfer, both breasts offered, baby is asleep following feeding. Baby is feeding on demand, mother's milk is in transition, baby's weight loss, voids, and stools are appropriate over past 24 hours. Parents have been giving supplement of formula due to concerns over Jaime positive status.  Parents shown how to transition baby off supplement as her milk comes in by using syringe at breast to satiate infant that has become accustomed to higher volume.Mother has no further questions for lactation consultant before discharge.

## 2024-11-21 NOTE — PROGRESS NOTES
Post-Partum Day Number 2 Progress Note    Patient doing well post-partum without significant complaints.  Voiding without difficulty, normal lochia. Breastfeeding and working with lactation.     Denies HA, visual changes, SOB, mid epigastric pain, or RUQ pain.       Vitals:  Patient Vitals for the past 24 hrs:   BP Temp Temp src Pulse Resp SpO2   24 0322 127/85 98.2 °F (36.8 °C) Oral 90 16 95 %   24 2027 134/82 98.2 °F (36.8 °C) Oral 72 16 97 %   24 1750 132/84 98.4 °F (36.9 °C) Oral 84 16 97 %   24 0945 (!) 135/90 98.9 °F (37.2 °C) Oral (!) 109 18 97 %     Temp (24hrs), Av.4 °F (36.9 °C), Min:98.2 °F (36.8 °C), Max:98.9 °F (37.2 °C)      Vital signs stable, afebrile.    Exam:  Patient without distress.               Abdomen soft, fundus firm, nontender               Lower extremities- nontender without edema; no cords    Labs: No results found for this or any previous visit (from the past 24 hour(s)).    Assessment and Plan:    Ms. Ana Cristina Galicia is a 34 y.o.  s/p  of a VMI at 39wks following IOL 2/2 GHTN.     GHTN  - BP normotensive, asymptomatic  - no need for meds  - Plan for 1 wk BP check in office     GDMA2  - discontinue insulin   - Plan for 2 hour GTT at 8-12wks post partum     Post partum Care  - Rh+/ Rubella immune  - Boy, circ complete  - Plan for discharge this afternoon    Sirena Rawls DO  8:08 AM

## 2024-11-21 NOTE — DISCHARGE SUMMARY
Patient ID:  Ana Cristina Galicia  [id number]  34 y.o.  1990    Admit Date: 2024    Discharge Date: 2024     Admitting Physician: Sirena Rawls DO  Attending Physician: Sirena Rawls DO    Admission Diagnoses:   Gestational Hypertension  Gestational diabetes [O24.419]    Discharge Diagnoses: Same as above with vaginal delivery producing a viable infant.  Information for the patient's :  Beny Galicia [[id number]]         Additional Diagnoses: none    Maternal Labs: Rh positive/ Rubella immune  Cord Blood Results:   Information for the patient's :  Beny Galicia [[id number]]     Lab Results   Component Value Date/Time    ABORH A POSITIVE 2024 12:56 PM    BILI IF DIRECT ERENDIRA POSITIVE, BILIRUBIN TO FOLLOW 2024 12:56 PM           History of Present Illness:   OB History          1    Para   1    Term   1            AB        Living   1         SAB        IAB        Ectopic        Molar        Multiple   0    Live Births   1              Admitted for IOL 2/2 GHTN on 24 at 38w6d with pregnancy complicated by GDMA2    Hospital Course:   Patient was admitted as above and delivered via vaginal delivery at 39wks on 24 .  Please the chart for details.  The postpartum course was unremarkable. Her blood pressures remained normal range. She was deemed stable for discharge home on day 2    Follow-up:  She was instructed to follow-up with her obstetrician in 1 week.    Results of Postpartum Depression Screening:    Postpartum Depression: Medium Risk (2024)    [place]  Depression Scale     Last EPDS Total Score: 6     Last EPDS Self Harm Result: Never       Signed:  Sirena Rawls DO  2024  8:11 AM

## 2024-11-26 ENCOUNTER — LACTATION ENCOUNTER (OUTPATIENT)
Facility: HOSPITAL | Age: 34
End: 2024-11-26

## 2024-11-26 NOTE — LACTATION NOTE
This note was copied from a baby's chart.  Seven day old infant admitted with low temp, lethargy  and undergoing septic workup. Infant sleeping at the time of my visit and mom is pumping.She obtained 50 ml at this pumping session. Prior to admission, mom had been breastfeeding infant. Since admission, she is feeding him via botle (pumped milk and formula as needed). She desires to put infant back to breast, but notices that he is not nursing as well as he had previously, since taking bottles. Recommend to mom that she offer the breast at each feeding and follow up with pumped supplementation to support needed weight gain. Mom states her left nipple \"turtles\" and is flatter, especially since milk is in. Provided mom with a nipple shield with instructions for use. Will follow up as needed.

## 2024-11-27 ENCOUNTER — HOSPITAL ENCOUNTER (EMERGENCY)
Facility: HOSPITAL | Age: 34
Discharge: HOME | End: 2024-11-27
Attending: EMERGENCY MEDICINE
Payer: COMMERCIAL

## 2024-11-27 VITALS
OXYGEN SATURATION: 98 % | DIASTOLIC BLOOD PRESSURE: 97 MMHG | RESPIRATION RATE: 13 BRPM | BODY MASS INDEX: 29.66 KG/M2 | HEART RATE: 69 BPM | HEIGHT: 67 IN | SYSTOLIC BLOOD PRESSURE: 152 MMHG | TEMPERATURE: 97.3 F

## 2024-11-27 DIAGNOSIS — R07.9 CHEST PAIN, UNSPECIFIED TYPE: Primary | ICD-10-CM

## 2024-11-27 DIAGNOSIS — I10 EPISODE OF HYPERTENSION: ICD-10-CM

## 2024-11-27 LAB
ANION GAP SERPL CALC-SCNC: 5 MMOL/L (ref 2–12)
BUN SERPL-MCNC: 14 MG/DL (ref 6–20)
BUN/CREAT SERPL: 20 (ref 12–20)
CALCIUM SERPL-MCNC: 8.9 MG/DL (ref 8.5–10.1)
CHLORIDE SERPL-SCNC: 103 MMOL/L (ref 97–108)
CO2 SERPL-SCNC: 28 MMOL/L (ref 21–32)
COMMENT:: NORMAL
CREAT SERPL-MCNC: 0.7 MG/DL (ref 0.55–1.02)
ERYTHROCYTE [DISTWIDTH] IN BLOOD BY AUTOMATED COUNT: 14.8 % (ref 11.5–14.5)
GLUCOSE BLD STRIP.AUTO-MCNC: 79 MG/DL (ref 65–117)
GLUCOSE SERPL-MCNC: 76 MG/DL (ref 65–100)
HCT VFR BLD AUTO: 41.3 % (ref 35–47)
HGB BLD-MCNC: 13.3 G/DL (ref 11.5–16)
MCH RBC QN AUTO: 29.4 PG (ref 26–34)
MCHC RBC AUTO-ENTMCNC: 32.2 G/DL (ref 30–36.5)
MCV RBC AUTO: 91.4 FL (ref 80–99)
NRBC # BLD: 0 K/UL (ref 0–0.01)
NRBC BLD-RTO: 0 PER 100 WBC
PLATELET # BLD AUTO: 357 K/UL (ref 150–400)
PMV BLD AUTO: 9.1 FL (ref 8.9–12.9)
POTASSIUM SERPL-SCNC: 3.7 MMOL/L (ref 3.5–5.1)
RBC # BLD AUTO: 4.52 M/UL (ref 3.8–5.2)
SERVICE CMNT-IMP: NORMAL
SODIUM SERPL-SCNC: 136 MMOL/L (ref 136–145)
SPECIMEN HOLD: NORMAL
TROPONIN I SERPL HS-MCNC: 9 NG/L (ref 0–51)
TROPONIN I SERPL HS-MCNC: 9 NG/L (ref 0–51)
WBC # BLD AUTO: 10.1 K/UL (ref 3.6–11)

## 2024-11-27 PROCEDURE — 36415 COLL VENOUS BLD VENIPUNCTURE: CPT

## 2024-11-27 PROCEDURE — 85027 COMPLETE CBC AUTOMATED: CPT

## 2024-11-27 PROCEDURE — 99284 EMERGENCY DEPT VISIT MOD MDM: CPT

## 2024-11-27 PROCEDURE — 93005 ELECTROCARDIOGRAM TRACING: CPT | Performed by: EMERGENCY MEDICINE

## 2024-11-27 PROCEDURE — 80048 BASIC METABOLIC PNL TOTAL CA: CPT

## 2024-11-27 PROCEDURE — 82962 GLUCOSE BLOOD TEST: CPT

## 2024-11-27 PROCEDURE — 84484 ASSAY OF TROPONIN QUANT: CPT

## 2024-11-27 ASSESSMENT — PAIN DESCRIPTION - PAIN TYPE: TYPE: OTHER (COMMENT)

## 2024-11-27 ASSESSMENT — PAIN DESCRIPTION - DESCRIPTORS: DESCRIPTORS: ACHING

## 2024-11-27 ASSESSMENT — PAIN DESCRIPTION - ONSET: ONSET: ON-GOING

## 2024-11-27 ASSESSMENT — PAIN DESCRIPTION - LOCATION: LOCATION: GROIN

## 2024-11-27 ASSESSMENT — PAIN DESCRIPTION - FREQUENCY: FREQUENCY: INTERMITTENT

## 2024-11-27 ASSESSMENT — PAIN - FUNCTIONAL ASSESSMENT
PAIN_FUNCTIONAL_ASSESSMENT: 0-10
PAIN_FUNCTIONAL_ASSESSMENT: PREVENTS OR INTERFERES SOME ACTIVE ACTIVITIES AND ADLS

## 2024-11-27 ASSESSMENT — PAIN DESCRIPTION - ORIENTATION: ORIENTATION: LOWER

## 2024-11-27 ASSESSMENT — PAIN SCALES - GENERAL: PAINLEVEL_OUTOF10: 2

## 2024-11-27 NOTE — ED TRIAGE NOTES
Pt arrives from upstairs visiting her . CC blurry vision, chest pain  Pt was admitted to for gestational HTN  Had vaginal birth on . Released from hospital    Hx of gestational diabetes pregnancy  Bp 154/94    Sertraline 100mg     Prenatal vitamin    Tylenol 800mg   Ibuprofen 800mg

## 2024-11-27 NOTE — ED PROVIDER NOTES
[facility] EMERGENCY DEP  EMERGENCY DEPARTMENT ENCOUNTER      Pt Name: Ana Cristina Galicia  MRN: [id number]  Birthdate 1990  Date of evaluation: 2024  Provider: Harman Baez MD    CHIEF COMPLAINT       Chief Complaint   Patient presents with    Chest Pain         HISTORY OF PRESENT ILLNESS   (Location/Symptom, Timing/Onset, Context/Setting, Quality, Duration, Modifying Factors, Severity)  Note limiting factors.   34-year-old female with chest pain today when being told that she would be able to take her  son out of the hospital into her home for the first time.  She had delivery about a month ago.  She has had intermittent high blood pressure over the past year.  She had some chest pains today but does not have chest pain in the ER    The history is provided by the patient.   Chest Pain  Pain location:  Substernal area  Pain quality: sharp    Pain radiates to:  Does not radiate  Pain severity:  Mild  Onset quality:  Sudden  Duration:  5 minutes  Timing:  Constant  Progression:  Resolved  Chronicity:  New  Context: not breathing, not drug use, not eating, not lifting and not movement    Context comment:  During emotional excitement at the notion of taking her  baby home today.  Relieved by:  Nothing  Worsened by:  Nothing  Ineffective treatments:  None tried  Associated symptoms: no abdominal pain, no altered mental status, no anorexia, no back pain, no claudication, no cough, no diaphoresis, no dizziness, no fatigue, no headache, no heartburn, no nausea, no numbness, no palpitations, no shortness of breath, no syncope and no vomiting          Review of External Medical Records:     Nursing Notes were reviewed.    REVIEW OF SYSTEMS    (2-9 systems for level 4, 10 or more for level 5)     Review of Systems   Constitutional:  Negative for activity change, appetite change, chills, diaphoresis and fatigue.   HENT:  Negative for congestion, ear pain, facial swelling and sore throat.    Eyes:

## 2024-11-27 NOTE — ED NOTES
Pt discharged to home with . Ambulated on unit prior to discharge without complaints. Pt aware of f/u plan. Denies questions at this time. PIV removed, tip intact.

## 2024-11-28 LAB
EKG ATRIAL RATE: 73 BPM
EKG DIAGNOSIS: NORMAL
EKG P AXIS: 66 DEGREES
EKG P-R INTERVAL: 156 MS
EKG Q-T INTERVAL: 374 MS
EKG QRS DURATION: 82 MS
EKG QTC CALCULATION (BAZETT): 403 MS
EKG R AXIS: 98 DEGREES
EKG T AXIS: 68 DEGREES
EKG VENTRICULAR RATE: 70 BPM

## 2024-11-28 PROCEDURE — 93010 ELECTROCARDIOGRAM REPORT: CPT | Performed by: SPECIALIST

## 2024-11-28 ASSESSMENT — ENCOUNTER SYMPTOMS
SHORTNESS OF BREATH: 0
CHEST TIGHTNESS: 0
EYE PAIN: 0
COUGH: 0
DIARRHEA: 0
ABDOMINAL PAIN: 0
BACK PAIN: 0
EYE DISCHARGE: 0
VOMITING: 0
SORE THROAT: 0
FACIAL SWELLING: 0
NAUSEA: 0
HEARTBURN: 0

## 2024-11-29 LAB
EKG ATRIAL RATE: 86 BPM
EKG DIAGNOSIS: NORMAL
EKG P AXIS: 75 DEGREES
EKG P-R INTERVAL: 156 MS
EKG Q-T INTERVAL: 426 MS
EKG QRS DURATION: 160 MS
EKG QTC CALCULATION (BAZETT): 509 MS
EKG R AXIS: 68 DEGREES
EKG T AXIS: -26 DEGREES
EKG VENTRICULAR RATE: 86 BPM

## 2024-11-29 PROCEDURE — 93010 ELECTROCARDIOGRAM REPORT: CPT | Performed by: SPECIALIST

## 2025-04-02 ENCOUNTER — OFFICE VISIT (OUTPATIENT)
Age: 35
End: 2025-04-02

## 2025-04-02 VITALS
HEART RATE: 86 BPM | RESPIRATION RATE: 16 BRPM | DIASTOLIC BLOOD PRESSURE: 84 MMHG | TEMPERATURE: 97.7 F | BODY MASS INDEX: 25.29 KG/M2 | SYSTOLIC BLOOD PRESSURE: 119 MMHG | OXYGEN SATURATION: 99 % | WEIGHT: 161.5 LBS

## 2025-04-02 DIAGNOSIS — J01.90 ACUTE NON-RECURRENT SINUSITIS, UNSPECIFIED LOCATION: Primary | ICD-10-CM

## 2025-04-02 RX ORDER — METOCLOPRAMIDE 5 MG/1
5 TABLET ORAL PRN
COMMUNITY

## 2025-04-02 RX ORDER — SWAB
1 SWAB, NON-MEDICATED MISCELLANEOUS
COMMUNITY

## 2025-04-02 RX ORDER — BISACODYL 5 MG
1 TABLET, DELAYED RELEASE (ENTERIC COATED) ORAL DAILY PRN
COMMUNITY

## 2025-04-02 RX ORDER — FLUTICASONE PROPIONATE 50 MCG
SPRAY, SUSPENSION (ML) NASAL
COMMUNITY

## 2025-04-02 RX ORDER — AZELASTINE 1 MG/ML
1 SPRAY, METERED NASAL 2 TIMES DAILY
COMMUNITY

## 2025-04-02 RX ORDER — NITROFURANTOIN MACROCRYSTALS 25 MG/1
CAPSULE ORAL 4 TIMES DAILY
COMMUNITY

## 2025-04-02 RX ORDER — ONDANSETRON 4 MG/1
TABLET, ORALLY DISINTEGRATING ORAL
COMMUNITY
Start: 2024-04-24

## 2025-04-02 RX ORDER — PROPRANOLOL HCL 20 MG
20 TABLET ORAL PRN
COMMUNITY

## 2025-04-02 NOTE — PROGRESS NOTES
Ana Cristina Galicia (:  1990) is a 34 y.o. female,New patient, here for evaluation of the following chief complaint(s):  Sinusitis (Sinus congestion, ear pain x 1 week )      Assessment & Plan :  Visit Diagnoses and Associated Orders       ORDERS WITHOUT AN ASSOCIATED DIAGNOSIS    azelastine (ASTELIN) 0.1 % nasal spray [07040]      bisacodyl (DULCOLAX) 5 MG EC tablet [1494]      fluticasone (FLONASE ALLERGY RELIEF) 50 MCG/ACT nasal spray [03192]      nitrofurantoin (MACRODANTIN) 25 MG capsule [5594]      metoclopramide (REGLAN) 5 MG tablet [5006]      ondansetron (ZOFRAN-ODT) 4 MG disintegrating tablet [77170]      Prenatal Vit-Fe Fumarate-FA (PRENATAL VITAMIN) 28-0.8 MG TABS tablet [99823]      propranolol (INDERAL) 20 MG tablet [3857]              Exam and history consistent with a bacterial sinusitis     Augmentin: 1 pill twice a day for 10 days    Continue saline nasal rinses and [place] pot use       Subjective :    Sinusitis         34 y.o. female presents with 7+ days of nasal congestion, ear pain and fullness, facial pain, cough and now with teeth aching.  Reports purulent nasal discharge and drainage.  Denies significant shortness of breath, cough, chest pain, nausea, vomiting, fever.  Began as a URI last week and thought she was getting better however she has now gotten worse again and now having dental pain and aching.  She has been using saline nasal rinses and [place]         Vitals:    25 0827   BP: 119/84   BP Site: Left Upper Arm   Patient Position: Sitting   BP Cuff Size: Large Adult   Pulse: 86   Resp: 16   Temp: 97.7 °F (36.5 °C)   SpO2: 99%   Weight: 73.3 kg (161 lb 8 oz)       No results found for this visit on 25.      Objective   Physical Exam  Constitutional:       General: She is not in acute distress.     Appearance: She is ill-appearing. She is not toxic-appearing.   HENT:      Head: Normocephalic and atraumatic.      Right Ear: Tympanic membrane, ear canal and

## 2025-04-02 NOTE — PATIENT INSTRUCTIONS
Exam and history consistent with a bacterial sinusitis     Augmentin: 1 pill twice a day for 10 days    Continue saline nasal rinses and [place] pot use